# Patient Record
Sex: MALE | Race: BLACK OR AFRICAN AMERICAN | Employment: FULL TIME | URBAN - METROPOLITAN AREA
[De-identification: names, ages, dates, MRNs, and addresses within clinical notes are randomized per-mention and may not be internally consistent; named-entity substitution may affect disease eponyms.]

---

## 2017-09-29 ENCOUNTER — HOSPITAL ENCOUNTER (OUTPATIENT)
Dept: LAB | Age: 57
Discharge: HOME | End: 2017-09-29
Payer: OTHER MISCELLANEOUS

## 2017-09-29 PROCEDURE — 36415 COLL VENOUS BLD VENIPUNCTURE: CPT | Performed by: PHYSICIAN ASSISTANT

## 2017-09-29 PROCEDURE — 80307 DRUG TEST PRSMV CHEM ANLYZR: CPT

## 2017-10-09 LAB
Lab: NORMAL
REFERENCE LAB,REFLB: NORMAL
TEST DESCRIPTION:,ATST: NORMAL

## 2018-04-25 ENCOUNTER — HOSPITAL ENCOUNTER (EMERGENCY)
Age: 58
Discharge: HOME | End: 2018-04-25
Attending: EMERGENCY MEDICINE
Payer: COMMERCIAL

## 2018-04-25 VITALS
DIASTOLIC BLOOD PRESSURE: 89 MMHG | HEIGHT: 72 IN | HEART RATE: 90 BPM | TEMPERATURE: 98.1 F | OXYGEN SATURATION: 97 % | BODY MASS INDEX: 27.09 KG/M2 | WEIGHT: 200 LBS | RESPIRATION RATE: 16 BRPM | SYSTOLIC BLOOD PRESSURE: 135 MMHG

## 2018-04-25 DIAGNOSIS — S05.02XA ABRASION OF LEFT CORNEA, INITIAL ENCOUNTER: Primary | ICD-10-CM

## 2018-04-25 PROCEDURE — 99284 EMERGENCY DEPT VISIT MOD MDM: CPT

## 2018-04-25 PROCEDURE — 74011250636 HC RX REV CODE- 250/636: Performed by: PHYSICIAN ASSISTANT

## 2018-04-25 PROCEDURE — 74011000250 HC RX REV CODE- 250: Performed by: NURSE PRACTITIONER

## 2018-04-25 RX ORDER — PROPARACAINE HYDROCHLORIDE 5 MG/ML
1 SOLUTION/ DROPS OPHTHALMIC
Status: COMPLETED | OUTPATIENT
Start: 2018-04-25 | End: 2018-04-25

## 2018-04-25 RX ORDER — SODIUM CHLORIDE 9 MG/ML
1000 INJECTION, SOLUTION INTRAVENOUS ONCE
Status: COMPLETED | OUTPATIENT
Start: 2018-04-25 | End: 2018-04-25

## 2018-04-25 RX ORDER — SODIUM CHLORIDE 9 MG/ML
1000 INJECTION, SOLUTION INTRAVENOUS CONTINUOUS
Status: DISCONTINUED | OUTPATIENT
Start: 2018-04-25 | End: 2018-04-25 | Stop reason: ALTCHOICE

## 2018-04-25 RX ORDER — ERYTHROMYCIN 5 MG/G
OINTMENT OPHTHALMIC
Qty: 3.5 G | Refills: 0 | Status: SHIPPED | OUTPATIENT
Start: 2018-04-25 | End: 2019-11-15

## 2018-04-25 RX ADMIN — PROPARACAINE HYDROCHLORIDE 1 DROP: 5 SOLUTION/ DROPS OPHTHALMIC at 13:49

## 2018-04-25 RX ADMIN — FLUORESCEIN SODIUM 1 STRIP: 0.6 STRIP OPHTHALMIC at 15:07

## 2018-04-25 RX ADMIN — SODIUM CHLORIDE 1000 ML: 9 INJECTION, SOLUTION INTRAVENOUS at 13:49

## 2018-04-25 NOTE — DISCHARGE INSTRUCTIONS
Corneal Scratches: Care Instructions  Your Care Instructions    The cornea is the clear surface that covers the front of the eye. When a speck of dirt, a wood chip, an insect, or another object flies into your eye, it can cause a painful scratch on the cornea. Wearing contact lenses too long or rubbing your eyes can also scratch the cornea. Small scratches usually heal in a day or two. Deeper scratches may take longer. If you have had a foreign object removed from your eye or you have a corneal scratch, you will need to watch for infection and vision problems while your eye heals. Follow-up care is a key part of your treatment and safety. Be sure to make and go to all appointments, and call your doctor if you are having problems. It's also a good idea to know your test results and keep a list of the medicines you take. How can you care for yourself at home? · The doctor probably used a medicine during your exam to numb your eye. When it wears off in 30 to 60 minutes, your eye pain may come back. Take pain medicines exactly as directed. ¨ If the doctor gave you a prescription medicine for pain, take it as prescribed. ¨ If you are not taking a prescription pain medicine, ask your doctor if you can take an over-the-counter medicine. ¨ Do not take two or more pain medicines at the same time unless the doctor told you to. Many pain medicines have acetaminophen, which is Tylenol. Too much acetaminophen (Tylenol) can be harmful. · Do not rub your injured eye. Rubbing can make it worse. · Use the prescribed eyedrops or ointment as directed. Be sure the dropper or bottle tip is clean. To put in eyedrops or ointment:  ¨ Tilt your head back, and pull your lower eyelid down with one finger. ¨ Drop or squirt the medicine inside the lower lid. ¨ Close your eye for 30 to 60 seconds to let the drops or ointment move around. ¨ Do not touch the ointment or dropper tip to your eyelashes or any other surface.   · Do not use your contact lens in your hurt eye until your doctor says you can. Also, do not wear eye makeup until your eye has healed. · Do not drive if you have blurred vision. · Bright light may hurt. Sunglasses can help. · To prevent eye injuries in the future, wear safety glasses or goggles when you work with machines or tools, mow the lawn, or ride a bike or motorcycle. When should you call for help? Call your doctor now or seek immediate medical care if:  ? · You have signs of an eye infection, such as:  ¨ Pus or thick discharge coming from the eye. ¨ Redness or swelling around the eye. ¨ A fever. ? · You have new or worse eye pain. ? · You have vision changes. ? · It feels like there is something in your eye. ? · Light hurts your eye. ? Watch closely for changes in your health, and be sure to contact your doctor if:  ? · You do not get better as expected. Where can you learn more? Go to http://yuliana-claire.info/. Enter Y137 in the search box to learn more about \"Corneal Scratches: Care Instructions. \"  Current as of: March 3, 2017  Content Version: 11.4  © 6995-8595 [employer]. Care instructions adapted under license by [employer] (which disclaims liability or warranty for this information). If you have questions about a medical condition or this instruction, always ask your healthcare professional. [place] [address] any warranty or liability for your use of this information.

## 2018-04-25 NOTE — ED PROVIDER NOTES
EMERGENCY DEPARTMENT HISTORY AND PHYSICAL EXAM    Date: 4/25/2018  Patient Name: Kristina Smart    History of Presenting Illness     Chief Complaint   Patient presents with    Foreign Body in Eye         History Provided By: Patient    Chief Complaint: left eye foreign body and pain  Duration: just prior to arrival just prior to arrival  Timing:  Constant  Location: left eye  Quality: \"irritating'  Severity: Mild  Modifying Factors: no aggravating or alleviating factor  Associated Symptoms: denies any other associated signs or symptoms      Additional History (Context): Kristina Smart is a 56 y.o. male with No significant past medical history who presents with C/O plaster in his left eye. Pt was working with plaster above his head when he felt some get into his left eye. He reports foreign body sensation, difficult to open eye, and feels like there is still plaster in his eye. Patient denies wearing contacts. He does wear glasses. Pt denies headache, trauma or foreign body sensation in right eye or any other symptoms or complaints. PCP: None    Current Outpatient Prescriptions   Medication Sig Dispense Refill    erythromycin (ILOTYCIN) ophthalmic ointment Apply 0.5cm strip of ointment to left eye four times a day for 5 days 3.5 g 0    oxyCODONE-acetaminophen (PERCOCET) 5-325 mg per tablet Take 1 Tab by mouth every four (4) hours as needed for Pain. Past History     Past Medical History:  Past Medical History:   Diagnosis Date    Back pain     Cortisone shots    Hematuria     Prostate cancer screening        Past Surgical History:  History reviewed. No pertinent surgical history.     Family History:  Family History   Problem Relation Age of Onset    Hypertension Mother     Diabetes Mother        Social History:  Social History   Substance Use Topics    Smoking status: Current Every Day Smoker     Packs/day: 0.50     Types: Cigars    Smokeless tobacco: Never Used    Alcohol use Yes       Allergies:  No Known Allergies      Review of Systems   Review of Systems   Constitutional: Negative for chills and fever. HENT: Negative for ear pain, sinus pain and sore throat. Eyes: Positive for pain and redness. Negative for photophobia and visual disturbance. All other systems reviewed and are negative. All Other Systems Negative  Physical Exam     Vitals:    04/25/18 1332   BP: 135/89   Pulse: 90   Resp: 16   Temp: 98.1 °F (36.7 °C)   SpO2: 97%   Weight: 90.7 kg (200 lb)   Height: 6' (1.829 m)     Physical Exam   Constitutional: He is oriented to person, place, and time. He appears well-nourished. No distress. Eyes: Conjunctivae, EOM and lids are normal. Pupils are equal, round, and reactive to light. Lids are everted and swept, no foreign bodies found. Right eye exhibits no chemosis, no discharge, no exudate and no hordeolum. No foreign body present in the right eye. Left eye exhibits no chemosis, no exudate and no hordeolum. No foreign body present in the left eye. Left conjunctiva is not injected. Left conjunctiva has no hemorrhage. Left eye exhibits normal extraocular motion and no nystagmus. Left pupil is round and reactive. Neck: Normal range of motion. Neck supple. Cardiovascular: Normal rate, regular rhythm, normal heart sounds and intact distal pulses. Pulmonary/Chest: Effort normal and breath sounds normal. No respiratory distress. He has no wheezes. He has no rales. Lymphadenopathy:     He has no cervical adenopathy. Neurological: He is alert and oriented to person, place, and time. Skin: Skin is warm and dry. He is not diaphoretic. Nursing note and vitals reviewed. Diagnostic Study Results     Labs -   No results found for this or any previous visit (from the past 12 hour(s)).     Radiologic Studies -   No orders to display     CT Results  (Last 48 hours)    None        CXR Results  (Last 48 hours)    None            Medical Decision Making   I am the first provider for this patient. I reviewed the vital signs, available nursing notes, past medical history, past surgical history, family history and social history. Vital Signs-Reviewed the patient's vital signs. Pulse Oximetry Analysis - 97% on room air        Records Reviewed: Nursing Notes    Procedures:  Eye Stain    Date/Time: 4/25/2018 2:57 PM    Performed by: NP  Supervising provider: Dr. Ced Clark        Corneal abrasion was present on eyelid eversion. Left eye location: 6 and 8 (large corneal abrasion between 6 and 8 O'clock) o'clock    Cornea is clear. Anterior chamber is clear. Patient tolerance: Patient tolerated the procedure well with no immediate complications  My total time at bedside, performing this procedure was 1-15 minutes. Provider Notes (Medical Decision Making):     DDX: retained foreign body, corneal abrasion, corneal ulcer, or other process. 55 YO M presents to ED for evaluation after plaster got in his left eye at work. Patient denies wearing contacts. Will irritate eye with yanet's lens and saline. Will stain eye after irrigating with 1liter of fluid. 2:58 PM: Pt received 750ml of normal saline irrigation to left eye. Pt reports his eye feels better but reports a \"gritting\" sensation. Eye dyed with fluorescein die and large corneal abrasion seen between 6-8 O'Clock position. Pt's EOM intact, denies change in vision. Advised patient to return to emergency department immediately for change in vision, pain when moving eye, fever, swelling to eye, discharge, or any change or worsening condition or concern. MED RECONCILIATION:  No current facility-administered medications for this encounter. Current Outpatient Prescriptions   Medication Sig    erythromycin (ILOTYCIN) ophthalmic ointment Apply 0.5cm strip of ointment to left eye four times a day for 5 days    oxyCODONE-acetaminophen (PERCOCET) 5-325 mg per tablet Take 1 Tab by mouth every four (4) hours as needed for Pain. Disposition: d/c home    DISCHARGE NOTE:   Pt has been reexamined. Patient has no new complaints, changes, or physical findings. Care plan outlined and precautions discussed. All medications were reviewed with the patient; will d/c home with erythromycin. All of pt's questions and concerns were addressed. Patient was instructed and agrees to follow up with PCP and opthamology, as well as to return to the ED upon further deterioration. Patient is ready to go home. Follow-up Information     Follow up With Details Comments [address] [facility] Call today for follow up appointment. [address] [place] [address]  [phone]          Current Discharge Medication List      START taking these medications    Details   erythromycin (ILOTYCIN) ophthalmic ointment Apply 0.5cm strip of ointment to left eye four times a day for 5 days  Qty: 3.5 g, Refills: 0               Diagnosis     Clinical Impression:   1.  Abrasion of left cornea, initial encounter

## 2018-04-25 NOTE — Clinical Note
Use erythromycin ointment as prescribed. Follow up with opthalmologist provided for follow up of your corneal abrasion. Return to emergency department for change or worsening symptoms or concerns.

## 2019-11-15 ENCOUNTER — HOSPITAL ENCOUNTER (EMERGENCY)
Age: 59
Discharge: HOME | End: 2019-11-15
Attending: EMERGENCY MEDICINE
Payer: MEDICARE

## 2019-11-15 ENCOUNTER — APPOINTMENT (OUTPATIENT)
Dept: MRI IMAGING | Age: 59
End: 2019-11-15
Attending: EMERGENCY MEDICINE
Payer: MEDICARE

## 2019-11-15 ENCOUNTER — APPOINTMENT (OUTPATIENT)
Dept: GENERAL RADIOLOGY | Age: 59
End: 2019-11-15
Attending: EMERGENCY MEDICINE
Payer: MEDICARE

## 2019-11-15 ENCOUNTER — APPOINTMENT (OUTPATIENT)
Dept: CT IMAGING | Age: 59
End: 2019-11-15
Attending: EMERGENCY MEDICINE
Payer: MEDICARE

## 2019-11-15 VITALS
BODY MASS INDEX: 25.42 KG/M2 | SYSTOLIC BLOOD PRESSURE: 127 MMHG | RESPIRATION RATE: 14 BRPM | HEART RATE: 61 BPM | WEIGHT: 187.4 LBS | DIASTOLIC BLOOD PRESSURE: 60 MMHG | OXYGEN SATURATION: 98 % | TEMPERATURE: 97.5 F

## 2019-11-15 DIAGNOSIS — R51.9 NONINTRACTABLE EPISODIC HEADACHE, UNSPECIFIED HEADACHE TYPE: Primary | ICD-10-CM

## 2019-11-15 DIAGNOSIS — R20.0 NUMBNESS AND TINGLING: ICD-10-CM

## 2019-11-15 DIAGNOSIS — R20.2 NUMBNESS AND TINGLING: ICD-10-CM

## 2019-11-15 LAB
ALBUMIN SERPL-MCNC: 4.2 G/DL (ref 3.4–5)
ALBUMIN/GLOB SERPL: 1.1 {RATIO} (ref 0.8–1.7)
ALP SERPL-CCNC: 74 U/L (ref 45–117)
ALT SERPL-CCNC: 29 U/L (ref 16–61)
ANION GAP SERPL CALC-SCNC: 3 MMOL/L (ref 3–18)
AST SERPL-CCNC: 18 U/L (ref 10–38)
ATRIAL RATE: 52 BPM
BASOPHILS # BLD: 0 K/UL (ref 0–0.1)
BASOPHILS NFR BLD: 0 % (ref 0–2)
BILIRUB SERPL-MCNC: 0.4 MG/DL (ref 0.2–1)
BUN SERPL-MCNC: 14 MG/DL (ref 7–18)
BUN/CREAT SERPL: 14 (ref 12–20)
CALCIUM SERPL-MCNC: 8.8 MG/DL (ref 8.5–10.1)
CALCULATED P AXIS, ECG09: 62 DEGREES
CALCULATED R AXIS, ECG10: 53 DEGREES
CALCULATED T AXIS, ECG11: 43 DEGREES
CHLORIDE SERPL-SCNC: 106 MMOL/L (ref 100–111)
CO2 SERPL-SCNC: 30 MMOL/L (ref 21–32)
CREAT SERPL-MCNC: 1.01 MG/DL (ref 0.6–1.3)
DIAGNOSIS, 93000: NORMAL
DIFFERENTIAL METHOD BLD: NORMAL
EOSINOPHIL # BLD: 0.1 K/UL (ref 0–0.4)
EOSINOPHIL NFR BLD: 2 % (ref 0–5)
ERYTHROCYTE [DISTWIDTH] IN BLOOD BY AUTOMATED COUNT: 13.6 % (ref 11.6–14.5)
GLOBULIN SER CALC-MCNC: 3.7 G/DL (ref 2–4)
GLUCOSE BLD STRIP.AUTO-MCNC: 97 MG/DL (ref 70–110)
GLUCOSE SERPL-MCNC: 86 MG/DL (ref 74–99)
HCT VFR BLD AUTO: 45.2 % (ref 36–48)
HGB BLD-MCNC: 15.3 G/DL (ref 13–16)
INR PPP: 0.9 (ref 0.8–1.2)
LYMPHOCYTES # BLD: 2.5 K/UL (ref 0.9–3.6)
LYMPHOCYTES NFR BLD: 36 % (ref 21–52)
MAGNESIUM SERPL-MCNC: 2.4 MG/DL (ref 1.6–2.6)
MCH RBC QN AUTO: 32 PG (ref 24–34)
MCHC RBC AUTO-ENTMCNC: 33.8 G/DL (ref 31–37)
MCV RBC AUTO: 94.6 FL (ref 74–97)
MONOCYTES # BLD: 0.5 K/UL (ref 0.05–1.2)
MONOCYTES NFR BLD: 7 % (ref 3–10)
NEUTS SEG # BLD: 3.8 K/UL (ref 1.8–8)
NEUTS SEG NFR BLD: 55 % (ref 40–73)
P-R INTERVAL, ECG05: 178 MS
PLATELET # BLD AUTO: 232 K/UL (ref 135–420)
PMV BLD AUTO: 11.5 FL (ref 9.2–11.8)
POTASSIUM SERPL-SCNC: 3.7 MMOL/L (ref 3.5–5.5)
PROT SERPL-MCNC: 7.9 G/DL (ref 6.4–8.2)
PROTHROMBIN TIME: 12 SEC (ref 11.5–15.2)
Q-T INTERVAL, ECG07: 412 MS
QRS DURATION, ECG06: 82 MS
QTC CALCULATION (BEZET), ECG08: 383 MS
RBC # BLD AUTO: 4.78 M/UL (ref 4.7–5.5)
SODIUM SERPL-SCNC: 139 MMOL/L (ref 136–145)
VENTRICULAR RATE, ECG03: 52 BPM
WBC # BLD AUTO: 6.9 K/UL (ref 4.6–13.2)

## 2019-11-15 PROCEDURE — 93005 ELECTROCARDIOGRAM TRACING: CPT

## 2019-11-15 PROCEDURE — 82962 GLUCOSE BLOOD TEST: CPT

## 2019-11-15 PROCEDURE — 80053 COMPREHEN METABOLIC PANEL: CPT

## 2019-11-15 PROCEDURE — 70450 CT HEAD/BRAIN W/O DYE: CPT

## 2019-11-15 PROCEDURE — 99285 EMERGENCY DEPT VISIT HI MDM: CPT

## 2019-11-15 PROCEDURE — 71045 X-RAY EXAM CHEST 1 VIEW: CPT

## 2019-11-15 PROCEDURE — 85610 PROTHROMBIN TIME: CPT

## 2019-11-15 PROCEDURE — 83735 ASSAY OF MAGNESIUM: CPT

## 2019-11-15 PROCEDURE — 70544 MR ANGIOGRAPHY HEAD W/O DYE: CPT

## 2019-11-15 PROCEDURE — 70547 MR ANGIOGRAPHY NECK W/O DYE: CPT

## 2019-11-15 PROCEDURE — 70551 MRI BRAIN STEM W/O DYE: CPT

## 2019-11-15 PROCEDURE — 85025 COMPLETE CBC W/AUTO DIFF WBC: CPT

## 2019-11-15 NOTE — ED NOTES
Assumed care of patient. Received patient resting on stretcher, awake, alert, oriented x 3. Introduced myself as his primary nurse. Awaiting diagnostic results and disposition. Explanation of wait provided to patient. Patient verbalized understanding. Will continue to monitor.

## 2019-11-15 NOTE — ED PROVIDER NOTES
EMERGENCY DEPARTMENT HISTORY AND PHYSICAL EXAM  This was created with voice recognition software and transcription errors may be present. 8:26 AM  Date: 11/15/2019  Patient Name: Staci Soto    History of Presenting Illness     Chief Complaint:    History Provided By:     HPI: Staci Soto is a 64 y.o. male who is been having a greater generalized intermittent left-sided headache over the past month that initially started at night when he would lay down or watch TV and is become more constant is now occurring during the day. He was seen by [facility] earlier in the week and referred to a neurosurgeon. He then noticed last night around 4 AM he got up to go the bathroom he noticed a sharp tingling sensation over his entire left side of his body. No weakness. This lasted about 2 minutes and resolved without intervention    PCP: None      Past History     Past Medical History:  Past Medical History:   Diagnosis Date    Back pain     Cortisone shots    Hematuria     Prostate cancer screening        Past Surgical History:  History reviewed. No pertinent surgical history. Family History:  Family History   Problem Relation Age of Onset    Hypertension Mother     Diabetes Mother        Social History:  Social History     Tobacco Use    Smoking status: Current Every Day Smoker     Packs/day: 0.50     Types: Cigars    Smokeless tobacco: Never Used   Substance Use Topics    Alcohol use: Yes    Drug use: Not on file       Allergies:  No Known Allergies    Review of Systems     Review of Systems   All other systems reviewed and are negative. 10 point review of systems otherwise negative unless noted in HPI. Physical Exam       Physical Exam   Constitutional: He is oriented to person, place, and time. He appears well-developed. HENT:   Head: Normocephalic and atraumatic. Eyes: Pupils are equal, round, and reactive to light. EOM are normal.   Neck: Normal range of motion. Neck supple. Cardiovascular: Normal rate, regular rhythm and normal heart sounds. Exam reveals no friction rub. No murmur heard. Pulmonary/Chest: Effort normal and breath sounds normal. No respiratory distress. He has no wheezes. Abdominal: Soft. He exhibits no distension. There is no tenderness. There is no rebound and no guarding. Musculoskeletal: Normal range of motion. Neurological: He is alert and oriented to person, place, and time. Skin: Skin is warm and dry. Psychiatric: He has a normal mood and affect. His behavior is normal. Thought content normal.       Diagnostic Study Results     Vital Signs   Visit Vitals  /75 (BP 1 Location: Left arm)   Pulse 66   Temp 97.5 °F (36.4 °C)   Resp 17   Wt 85 kg (187 lb 6.4 oz)   SpO2 98%   BMI 25.42 kg/m²      EKG: EKG shows sinus at 52 with a normal axis and normal intervals there is no ST elevation or depression and no hypertrophy  Labs:   Imaging:     Medical Decision Making     ED Course: Progress Notes, Reevaluation, and Consults:      Provider Notes (Medical Decision Making): 61-year-old gentleman with 2 problems    1. Headache worse at night we will check CT may need outpatient MRI    2.  Left-sided tingling. More of a positive sensation to the negative sensation concerning for TIA but will be assessed by tele-neuro    MRI brain wnl  MRA brain reviewed. MRA neck reviewed. Dw/ teleneuro; ok to d/c for continued outpt follow up. Diagnosis     Clinical Impression: No diagnosis found. Disposition:    Patient's Medications   Start Taking    No medications on file   Continue Taking    No medications on file   These Medications have changed    No medications on file   Stop Taking    ERYTHROMYCIN (ILOTYCIN) OPHTHALMIC OINTMENT    Apply 0.5cm strip of ointment to left eye four times a day for 5 days    METHOCARBAMOL (ROBAXIN) 500 MG TABLET    Take 1 Tab by mouth four (4) times daily.     OXYCODONE-ACETAMINOPHEN (PERCOCET) 5-325 MG PER TABLET Take 1 Tab by mouth every four (4) hours as needed for Pain.

## 2019-11-15 NOTE — DISCHARGE INSTRUCTIONS
Patient Education        Numbness and Tingling: Care Instructions  Your Care Instructions    Many things can cause numbness or tingling. Swelling may put pressure on a nerve. This could cause you to lose feeling or have a pins-and-needles sensation on part of your body. Nerves may be damaged from trauma, toxins, or diseases, such as diabetes or multiple sclerosis (MS). Sometimes, though, the cause is not clear. If there is no clear reason for your symptoms, and you are not having any other symptoms, your doctor may suggest watching and waiting for a while to see if the numbness or tingling goes away on its own. Your doctor may want you to have blood or nerve tests to find the cause of your symptoms. Follow-up care is a key part of your treatment and safety. Be sure to make and go to all appointments, and call your doctor if you are having problems. It's also a good idea to know your test results and keep a list of the medicines you take. How can you care for yourself at home? · If your doctor prescribes medicine, take it exactly as directed. Call your doctor if you think you are having a problem with your medicine. · If you have any swelling, put ice or a cold pack on the area for 10 to 20 minutes at a time. Put a thin cloth between the ice and your skin. When should you call for help? Call 911 anytime you think you may need emergency care. For example, call if:    · You have weakness, numbness, or tingling in both legs.     · You lose bowel or bladder control.     · You have symptoms of a stroke. These may include:  ? Sudden numbness, tingling, weakness, or loss of movement in your face, arm, or leg, especially on only one side of your body. ? Sudden vision changes. ? Sudden trouble speaking. ? Sudden confusion or trouble understanding simple statements. ? Sudden problems with walking or balance.   ? A sudden, severe headache that is different from past headaches.    Watch closely for changes in your health, and be sure to contact your doctor if you have any problems, or if:    · You do not get better as expected. Where can you learn more? Go to http://yuliana-claire.info/. Enter D725 in the search box to learn more about \"Numbness and Tingling: Care Instructions. \"  Current as of: March 28, 2019  Content Version: 12.2  © 7219-7234 [employer]. Care instructions adapted under license by [employer] (which disclaims liability or warranty for this information). If you have questions about a medical condition or this instruction, always ask your healthcare professional. [address] any warranty or liability for your use of this information. Patient Education        Headache: Care Instructions  Your Care Instructions    Headaches have many possible causes. Most headaches aren't a sign of a more serious problem, and they will get better on their own. Home treatment may help you feel better faster. The doctor has checked you carefully, but problems can develop later. If you notice any problems or new symptoms, get medical treatment right away. Follow-up care is a key part of your treatment and safety. Be sure to make and go to all appointments, and call your doctor if you are having problems. It's also a good idea to know your test results and keep a list of the medicines you take. How can you care for yourself at home? · Do not drive if you have taken a prescription pain medicine. · Rest in a quiet, dark room until your headache is gone. Close your eyes and try to relax or go to sleep. Don't watch TV or read. · Put a cold, moist cloth or cold pack on the painful area for 10 to 20 minutes at a time. Put a thin cloth between the cold pack and your skin. · Use a warm, moist towel or a heating pad set on low to relax tight shoulder and neck muscles. · Have someone gently massage your neck and shoulders.   · Take pain medicines exactly as directed. ? If the doctor gave you a prescription medicine for pain, take it as prescribed. ? If you are not taking a prescription pain medicine, ask your doctor if you can take an over-the-counter medicine. · Be careful not to take pain medicine more often than the instructions allow, because you may get worse or more frequent headaches when the medicine wears off. · Do not ignore new symptoms that occur with a headache, such as a fever, weakness or numbness, vision changes, or confusion. These may be signs of a more serious problem. To prevent headaches  · Keep a headache diary so you can figure out what triggers your headaches. Avoiding triggers may help you prevent headaches. Record when each headache began, how long it lasted, and what the pain was like (throbbing, aching, stabbing, or dull). Write down any other symptoms you had with the headache, such as nausea, flashing lights or dark spots, or sensitivity to bright light or loud noise. Note if the headache occurred near your period. List anything that might have triggered the headache, such as certain foods (chocolate, cheese, wine) or odors, smoke, bright light, stress, or lack of sleep. · Find healthy ways to deal with stress. Headaches are most common during or right after stressful times. Take time to relax before and after you do something that has caused a headache in the past.  · Try to keep your muscles relaxed by keeping good posture. Check your jaw, face, neck, and shoulder muscles for tension, and try relaxing them. When sitting at a desk, change positions often, and stretch for 30 seconds each hour. · Get plenty of sleep and exercise. · Eat regularly and well. Long periods without food can trigger a headache. · Treat yourself to a massage. Some people find that regular massages are very helpful in relieving tension. · Limit caffeine by not drinking too much coffee, tea, or soda.  But don't quit caffeine suddenly, because that can also give you headaches. · Reduce eyestrain from computers by blinking frequently and looking away from the computer screen every so often. Make sure you have proper eyewear and that your monitor is set up properly, about an arm's length away. · Seek help if you have depression or anxiety. Your headaches may be linked to these conditions. Treatment can both prevent headaches and help with symptoms of anxiety or depression. When should you call for help? Call 911 anytime you think you may need emergency care. For example, call if:    · You have signs of a stroke. These may include:  ? Sudden numbness, paralysis, or weakness in your face, arm, or leg, especially on only one side of your body. ? Sudden vision changes. ? Sudden trouble speaking. ? Sudden confusion or trouble understanding simple statements. ? Sudden problems with walking or balance. ? A sudden, severe headache that is different from past headaches.    Call your doctor now or seek immediate medical care if:    · You have a new or worse headache.     · Your headache gets much worse. Where can you learn more? Go to http://yuliana-claire.info/. Enter M271 in the search box to learn more about \"Headache: Care Instructions. \"  Current as of: March 28, 2019  Content Version: 12.2  © 6464-4098 [employer]. Care instructions adapted under license by [employer] (which disclaims liability or warranty for this information). If you have questions about a medical condition or this instruction, always ask your healthcare professional. [place] [address] any warranty or liability for your use of this information.

## 2023-10-27 ENCOUNTER — APPOINTMENT (OUTPATIENT)
Facility: HOSPITAL | Age: 63
DRG: 321 | End: 2023-10-27
Payer: MEDICARE

## 2023-10-27 ENCOUNTER — HOSPITAL ENCOUNTER (INPATIENT)
Facility: HOSPITAL | Age: 63
LOS: 4 days | Discharge: HOME | DRG: 321 | End: 2023-10-31
Attending: STUDENT IN AN ORGANIZED HEALTH CARE EDUCATION/TRAINING PROGRAM | Admitting: INTERNAL MEDICINE
Payer: MEDICARE

## 2023-10-27 DIAGNOSIS — I21.3 STEMI (ST ELEVATION MYOCARDIAL INFARCTION) (HCC): Primary | ICD-10-CM

## 2023-10-27 DIAGNOSIS — R07.9 CHEST PAIN, UNSPECIFIED TYPE: ICD-10-CM

## 2023-10-27 LAB
ACT BLD: 185 SEC (ref 74–125)
ALBUMIN SERPL-MCNC: 4.1 G/DL (ref 3.5–5)
ALBUMIN/GLOB SERPL: 1.3 (ref 1.1–2.2)
ALP SERPL-CCNC: 65 U/L (ref 45–117)
ALT SERPL-CCNC: 25 U/L (ref 12–78)
ANION GAP SERPL CALC-SCNC: 9 MMOL/L (ref 5–15)
AST SERPL W P-5'-P-CCNC: 8 U/L (ref 15–37)
BASE DEFICIT BLD-SCNC: 6.9 MMOL/L
BASOPHILS # BLD: 0.1 K/UL (ref 0–0.1)
BASOPHILS NFR BLD: 1 % (ref 0–1)
BILIRUB SERPL-MCNC: 0.3 MG/DL (ref 0.2–1)
BUN SERPL-MCNC: 16 MG/DL (ref 6–20)
BUN/CREAT SERPL: 13 (ref 12–20)
CA-I BLD-MCNC: 1.15 MMOL/L (ref 1.12–1.32)
CA-I BLD-MCNC: 9.1 MG/DL (ref 8.5–10.1)
CHLORIDE BLD-SCNC: 110 MMOL/L (ref 98–107)
CHLORIDE SERPL-SCNC: 110 MMOL/L (ref 97–108)
CO2 BLD-SCNC: 17 MMOL/L
CO2 SERPL-SCNC: 24 MMOL/L (ref 21–32)
CREAT SERPL-MCNC: 1.27 MG/DL (ref 0.7–1.3)
CREAT UR-MCNC: 1.26 MG/DL (ref 0.6–1.3)
DIFFERENTIAL METHOD BLD: NORMAL
ECHO BSA: 2.04 M2
EOSINOPHIL # BLD: 0 K/UL (ref 0–0.4)
EOSINOPHIL NFR BLD: 1 % (ref 0–7)
ERYTHROCYTE [DISTWIDTH] IN BLOOD BY AUTOMATED COUNT: 13.8 % (ref 11.5–14.5)
GLOBULIN SER CALC-MCNC: 3.1 G/DL (ref 2–4)
GLUCOSE BLD STRIP.AUTO-MCNC: 318 MG/DL (ref 65–100)
GLUCOSE SERPL-MCNC: 152 MG/DL (ref 65–100)
HCO3 BLD-SCNC: 16.7 MMOL/L (ref 19–28)
HCT VFR BLD AUTO: 40.4 % (ref 36.6–50.3)
HGB BLD-MCNC: 13.7 G/DL (ref 12.1–17)
IMM GRANULOCYTES # BLD AUTO: 0 K/UL (ref 0–0.04)
IMM GRANULOCYTES NFR BLD AUTO: 0 % (ref 0–0.5)
INR PPP: 0.9 (ref 0.9–1.1)
LACTATE BLD-SCNC: 4.85 MMOL/L (ref 0.4–2)
LYMPHOCYTES # BLD: 2.2 K/UL (ref 0.8–3.5)
LYMPHOCYTES NFR BLD: 27 % (ref 12–49)
MCH RBC QN AUTO: 31.9 PG (ref 26–34)
MCHC RBC AUTO-ENTMCNC: 33.9 G/DL (ref 30–36.5)
MCV RBC AUTO: 94 FL (ref 80–99)
MONOCYTES # BLD: 0.5 K/UL (ref 0–1)
MONOCYTES NFR BLD: 7 % (ref 5–13)
NEUTS SEG # BLD: 5.1 K/UL (ref 1.8–8)
NEUTS SEG NFR BLD: 64 % (ref 32–75)
NRBC # BLD: 0 K/UL (ref 0–0.01)
NRBC BLD-RTO: 0 PER 100 WBC
PCO2 BLD: 28.1 MMHG (ref 35–45)
PERFORMED BY:: ABNORMAL
PERFORMED BY:: ABNORMAL
PH BLD: 7.38 (ref 7.35–7.45)
PLATELET # BLD AUTO: 250 K/UL (ref 150–400)
PMV BLD AUTO: 10.9 FL (ref 8.9–12.9)
PO2 BLD: 349 MMHG (ref 75–100)
POTASSIUM BLD-SCNC: 3.2 MMOL/L (ref 3.5–5.5)
POTASSIUM SERPL-SCNC: 3.5 MMOL/L (ref 3.5–5.1)
PROT SERPL-MCNC: 7.2 G/DL (ref 6.4–8.2)
PROTHROMBIN TIME: 12.5 SEC (ref 11.9–14.6)
RBC # BLD AUTO: 4.3 M/UL (ref 4.1–5.7)
SAO2 % BLD: 100 %
SERVICE CMNT-IMP: ABNORMAL
SODIUM BLD-SCNC: 140 MMOL/L (ref 136–145)
SODIUM SERPL-SCNC: 143 MMOL/L (ref 136–145)
SPECIMEN SITE: ABNORMAL
TROPONIN I SERPL HS-MCNC: 165 NG/L (ref 0–76)
UFH PPP CHRO-ACNC: <0.1 IU/ML
WBC # BLD AUTO: 7.9 K/UL (ref 4.1–11.1)

## 2023-10-27 PROCEDURE — 96365 THER/PROPH/DIAG IV INF INIT: CPT

## 2023-10-27 PROCEDURE — 94761 N-INVAS EAR/PLS OXIMETRY MLT: CPT

## 2023-10-27 PROCEDURE — 6360000002 HC RX W HCPCS: Performed by: STUDENT IN AN ORGANIZED HEALTH CARE EDUCATION/TRAINING PROGRAM

## 2023-10-27 PROCEDURE — 96375 TX/PRO/DX INJ NEW DRUG ADDON: CPT

## 2023-10-27 PROCEDURE — 84295 ASSAY OF SERUM SODIUM: CPT

## 2023-10-27 PROCEDURE — C1760 CLOSURE DEV, VASC: HCPCS | Performed by: INTERNAL MEDICINE

## 2023-10-27 PROCEDURE — 2500000003 HC RX 250 WO HCPCS: Performed by: EMERGENCY MEDICINE

## 2023-10-27 PROCEDURE — 99291 CRITICAL CARE FIRST HOUR: CPT

## 2023-10-27 PROCEDURE — C1887 CATHETER, GUIDING: HCPCS | Performed by: INTERNAL MEDICINE

## 2023-10-27 PROCEDURE — 82947 ASSAY GLUCOSE BLOOD QUANT: CPT

## 2023-10-27 PROCEDURE — 84132 ASSAY OF SERUM POTASSIUM: CPT

## 2023-10-27 PROCEDURE — 2580000003 HC RX 258: Performed by: STUDENT IN AN ORGANIZED HEALTH CARE EDUCATION/TRAINING PROGRAM

## 2023-10-27 PROCEDURE — 82330 ASSAY OF CALCIUM: CPT

## 2023-10-27 PROCEDURE — 99152 MOD SED SAME PHYS/QHP 5/>YRS: CPT | Performed by: INTERNAL MEDICINE

## 2023-10-27 PROCEDURE — 5A12012 PERFORMANCE OF CARDIAC OUTPUT, SINGLE, MANUAL: ICD-10-PCS | Performed by: STUDENT IN AN ORGANIZED HEALTH CARE EDUCATION/TRAINING PROGRAM

## 2023-10-27 PROCEDURE — 6360000002 HC RX W HCPCS: Performed by: INTERNAL MEDICINE

## 2023-10-27 PROCEDURE — B2111ZZ FLUOROSCOPY OF MULTIPLE CORONARY ARTERIES USING LOW OSMOLAR CONTRAST: ICD-10-PCS | Performed by: INTERNAL MEDICINE

## 2023-10-27 PROCEDURE — 93458 L HRT ARTERY/VENTRICLE ANGIO: CPT | Performed by: INTERNAL MEDICINE

## 2023-10-27 PROCEDURE — C1769 GUIDE WIRE: HCPCS | Performed by: INTERNAL MEDICINE

## 2023-10-27 PROCEDURE — 6360000002 HC RX W HCPCS: Performed by: EMERGENCY MEDICINE

## 2023-10-27 PROCEDURE — 92950 HEART/LUNG RESUSCITATION CPR: CPT

## 2023-10-27 PROCEDURE — 96374 THER/PROPH/DIAG INJ IV PUSH: CPT

## 2023-10-27 PROCEDURE — 2500000003 HC RX 250 WO HCPCS: Performed by: STUDENT IN AN ORGANIZED HEALTH CARE EDUCATION/TRAINING PROGRAM

## 2023-10-27 PROCEDURE — 85610 PROTHROMBIN TIME: CPT

## 2023-10-27 PROCEDURE — 6360000002 HC RX W HCPCS: Performed by: ANESTHESIOLOGY

## 2023-10-27 PROCEDURE — 6360000004 HC RX CONTRAST MEDICATION: Performed by: INTERNAL MEDICINE

## 2023-10-27 PROCEDURE — 2500000003 HC RX 250 WO HCPCS: Performed by: INTERNAL MEDICINE

## 2023-10-27 PROCEDURE — C1894 INTRO/SHEATH, NON-LASER: HCPCS | Performed by: INTERNAL MEDICINE

## 2023-10-27 PROCEDURE — 93005 ELECTROCARDIOGRAM TRACING: CPT | Performed by: STUDENT IN AN ORGANIZED HEALTH CARE EDUCATION/TRAINING PROGRAM

## 2023-10-27 PROCEDURE — 2500000003 HC RX 250 WO HCPCS: Performed by: ANESTHESIOLOGY

## 2023-10-27 PROCEDURE — C1874 STENT, COATED/COV W/DEL SYS: HCPCS | Performed by: INTERNAL MEDICINE

## 2023-10-27 PROCEDURE — 85025 COMPLETE CBC W/AUTO DIFF WBC: CPT

## 2023-10-27 PROCEDURE — 2100000000 HC CCU R&B

## 2023-10-27 PROCEDURE — 85347 COAGULATION TIME ACTIVATED: CPT

## 2023-10-27 PROCEDURE — 4A023N7 MEASUREMENT OF CARDIAC SAMPLING AND PRESSURE, LEFT HEART, PERCUTANEOUS APPROACH: ICD-10-PCS | Performed by: INTERNAL MEDICINE

## 2023-10-27 PROCEDURE — 82803 BLOOD GASES ANY COMBINATION: CPT

## 2023-10-27 PROCEDURE — 85520 HEPARIN ASSAY: CPT

## 2023-10-27 PROCEDURE — 6370000000 HC RX 637 (ALT 250 FOR IP)

## 2023-10-27 PROCEDURE — 027034Z DILATION OF CORONARY ARTERY, ONE ARTERY WITH DRUG-ELUTING INTRALUMINAL DEVICE, PERCUTANEOUS APPROACH: ICD-10-PCS | Performed by: INTERNAL MEDICINE

## 2023-10-27 PROCEDURE — 99153 MOD SED SAME PHYS/QHP EA: CPT | Performed by: INTERNAL MEDICINE

## 2023-10-27 PROCEDURE — 80053 COMPREHEN METABOLIC PANEL: CPT

## 2023-10-27 PROCEDURE — 36415 COLL VENOUS BLD VENIPUNCTURE: CPT

## 2023-10-27 PROCEDURE — 83605 ASSAY OF LACTIC ACID: CPT

## 2023-10-27 PROCEDURE — 71045 X-RAY EXAM CHEST 1 VIEW: CPT

## 2023-10-27 PROCEDURE — 93005 ELECTROCARDIOGRAM TRACING: CPT | Performed by: INTERNAL MEDICINE

## 2023-10-27 PROCEDURE — C9606 PERC D-E COR REVASC W AMI S: HCPCS | Performed by: INTERNAL MEDICINE

## 2023-10-27 PROCEDURE — 2709999900 HC NON-CHARGEABLE SUPPLY: Performed by: INTERNAL MEDICINE

## 2023-10-27 PROCEDURE — 84484 ASSAY OF TROPONIN QUANT: CPT

## 2023-10-27 PROCEDURE — 6370000000 HC RX 637 (ALT 250 FOR IP): Performed by: EMERGENCY MEDICINE

## 2023-10-27 PROCEDURE — 2580000003 HC RX 258: Performed by: INTERNAL MEDICINE

## 2023-10-27 PROCEDURE — 2500000003 HC RX 250 WO HCPCS

## 2023-10-27 DEVICE — STENT ONYXNG45015UX ONYX 4.50X15RX
Type: IMPLANTABLE DEVICE | Status: FUNCTIONAL
Brand: ONYX FRONTIER™

## 2023-10-27 RX ORDER — METOPROLOL SUCCINATE 50 MG/1
50 TABLET, EXTENDED RELEASE ORAL DAILY
Status: DISCONTINUED | OUTPATIENT
Start: 2023-10-28 | End: 2023-10-28

## 2023-10-27 RX ORDER — NOREPINEPHRINE BITARTRATE 0.06 MG/ML
INJECTION, SOLUTION INTRAVENOUS
Status: DISPENSED
Start: 2023-10-27 | End: 2023-10-28

## 2023-10-27 RX ORDER — ATROPINE SULFATE 0.1 MG/ML
INJECTION INTRAVENOUS DAILY PRN
Status: COMPLETED | OUTPATIENT
Start: 2023-10-27 | End: 2023-10-27

## 2023-10-27 RX ORDER — HEPARIN SODIUM 1000 [USP'U]/ML
2000 INJECTION, SOLUTION INTRAVENOUS; SUBCUTANEOUS PRN
Status: DISCONTINUED | OUTPATIENT
Start: 2023-10-27 | End: 2023-10-31 | Stop reason: HOSPADM

## 2023-10-27 RX ORDER — ASPIRIN 81 MG/1
324 TABLET, CHEWABLE ORAL DAILY
Status: DISCONTINUED | OUTPATIENT
Start: 2023-10-28 | End: 2023-10-28

## 2023-10-27 RX ORDER — AMIODARONE HYDROCHLORIDE 50 MG/ML
INJECTION, SOLUTION INTRAVENOUS DAILY PRN
Status: COMPLETED | OUTPATIENT
Start: 2023-10-27 | End: 2023-10-27

## 2023-10-27 RX ORDER — NOREPINEPHRINE BITARTRATE 0.06 MG/ML
INJECTION, SOLUTION INTRAVENOUS CONTINUOUS PRN
Status: COMPLETED | OUTPATIENT
Start: 2023-10-27 | End: 2023-10-27

## 2023-10-27 RX ORDER — ASPIRIN 81 MG/1
81 TABLET, CHEWABLE ORAL DAILY
Status: DISCONTINUED | OUTPATIENT
Start: 2023-10-28 | End: 2023-10-31 | Stop reason: HOSPADM

## 2023-10-27 RX ORDER — MAGNESIUM SULFATE IN WATER 40 MG/ML
2000 INJECTION, SOLUTION INTRAVENOUS ONCE
Status: COMPLETED | OUTPATIENT
Start: 2023-10-27 | End: 2023-10-27

## 2023-10-27 RX ORDER — CALCIUM CHLORIDE 100 MG/ML
INJECTION INTRAVENOUS; INTRAVENTRICULAR
Status: DISPENSED
Start: 2023-10-27 | End: 2023-10-28

## 2023-10-27 RX ORDER — DIPHENHYDRAMINE HYDROCHLORIDE 50 MG/ML
INJECTION INTRAMUSCULAR; INTRAVENOUS PRN
Status: DISCONTINUED | OUTPATIENT
Start: 2023-10-27 | End: 2023-10-27 | Stop reason: HOSPADM

## 2023-10-27 RX ORDER — EPTIFIBATIDE 0.75 MG/ML
INJECTION, SOLUTION INTRAVENOUS CONTINUOUS PRN
Status: DISCONTINUED | OUTPATIENT
Start: 2023-10-27 | End: 2023-10-27 | Stop reason: HOSPADM

## 2023-10-27 RX ORDER — MIDAZOLAM HYDROCHLORIDE 1 MG/ML
INJECTION INTRAMUSCULAR; INTRAVENOUS PRN
Status: DISCONTINUED | OUTPATIENT
Start: 2023-10-27 | End: 2023-10-27 | Stop reason: HOSPADM

## 2023-10-27 RX ORDER — LIDOCAINE HYDROCHLORIDE 10 MG/ML
INJECTION, SOLUTION INFILTRATION; PERINEURAL PRN
Status: DISCONTINUED | OUTPATIENT
Start: 2023-10-27 | End: 2023-10-27 | Stop reason: HOSPADM

## 2023-10-27 RX ORDER — HEPARIN SODIUM 1000 [USP'U]/ML
INJECTION, SOLUTION INTRAVENOUS; SUBCUTANEOUS PRN
Status: DISCONTINUED | OUTPATIENT
Start: 2023-10-27 | End: 2023-10-27 | Stop reason: HOSPADM

## 2023-10-27 RX ORDER — HEPARIN SODIUM 1000 [USP'U]/ML
4000 INJECTION, SOLUTION INTRAVENOUS; SUBCUTANEOUS ONCE
Status: COMPLETED | OUTPATIENT
Start: 2023-10-27 | End: 2023-10-27

## 2023-10-27 RX ORDER — MAGNESIUM SULFATE HEPTAHYDRATE 500 MG/ML
INJECTION, SOLUTION INTRAMUSCULAR; INTRAVENOUS DAILY PRN
Status: COMPLETED | OUTPATIENT
Start: 2023-10-27 | End: 2023-10-27

## 2023-10-27 RX ORDER — SODIUM CHLORIDE 9 MG/ML
INJECTION, SOLUTION INTRAVENOUS CONTINUOUS PRN
Status: COMPLETED | OUTPATIENT
Start: 2023-10-27 | End: 2023-10-27

## 2023-10-27 RX ORDER — CALCIUM CHLORIDE 100 MG/ML
INJECTION INTRAVENOUS; INTRAVENTRICULAR DAILY PRN
Status: COMPLETED | OUTPATIENT
Start: 2023-10-27 | End: 2023-10-27

## 2023-10-27 RX ORDER — FENTANYL CITRATE 50 UG/ML
INJECTION, SOLUTION INTRAMUSCULAR; INTRAVENOUS PRN
Status: DISCONTINUED | OUTPATIENT
Start: 2023-10-27 | End: 2023-10-27 | Stop reason: HOSPADM

## 2023-10-27 RX ORDER — ASPIRIN 81 MG/1
TABLET, CHEWABLE ORAL
Status: COMPLETED
Start: 2023-10-27 | End: 2023-10-27

## 2023-10-27 RX ORDER — HEPARIN SODIUM 1000 [USP'U]/ML
2000 INJECTION, SOLUTION INTRAVENOUS; SUBCUTANEOUS
Status: COMPLETED | OUTPATIENT
Start: 2023-10-27 | End: 2023-10-27

## 2023-10-27 RX ORDER — EPTIFIBATIDE 2 MG/ML
INJECTION, SOLUTION INTRAVENOUS PRN
Status: DISCONTINUED | OUTPATIENT
Start: 2023-10-27 | End: 2023-10-27 | Stop reason: HOSPADM

## 2023-10-27 RX ORDER — EPINEPHRINE IN SOD CHLOR,ISO 1 MG/10 ML
SYRINGE (ML) INTRAVENOUS DAILY PRN
Status: COMPLETED | OUTPATIENT
Start: 2023-10-27 | End: 2023-10-27

## 2023-10-27 RX ORDER — AMIODARONE HYDROCHLORIDE 50 MG/ML
INJECTION, SOLUTION INTRAVENOUS
Status: DISPENSED
Start: 2023-10-27 | End: 2023-10-28

## 2023-10-27 RX ORDER — HEPARIN SODIUM 10000 [USP'U]/100ML
5-30 INJECTION, SOLUTION INTRAVENOUS CONTINUOUS
Status: DISCONTINUED | OUTPATIENT
Start: 2023-10-27 | End: 2023-10-31 | Stop reason: HOSPADM

## 2023-10-27 RX ORDER — ATORVASTATIN CALCIUM 40 MG/1
80 TABLET, FILM COATED ORAL NIGHTLY
Status: CANCELLED | OUTPATIENT
Start: 2023-10-27

## 2023-10-27 RX ORDER — HEPARIN SODIUM 1000 [USP'U]/ML
4000 INJECTION, SOLUTION INTRAVENOUS; SUBCUTANEOUS PRN
Status: DISCONTINUED | OUTPATIENT
Start: 2023-10-27 | End: 2023-10-31 | Stop reason: HOSPADM

## 2023-10-27 RX ADMIN — TICAGRELOR 180 MG: 90 TABLET ORAL at 22:24

## 2023-10-27 RX ADMIN — HEPARIN SODIUM AND DEXTROSE 12 UNITS/KG/HR: 10000; 5 INJECTION INTRAVENOUS at 21:27

## 2023-10-27 RX ADMIN — AMIODARONE HYDROCHLORIDE 0.5 MG/MIN: 1.8 INJECTION, SOLUTION INTRAVENOUS at 22:26

## 2023-10-27 RX ADMIN — SODIUM BICARBONATE: 84 INJECTION, SOLUTION INTRAVENOUS at 22:09

## 2023-10-27 RX ADMIN — HEPARIN SODIUM 2000 UNITS: 1000 INJECTION INTRAVENOUS; SUBCUTANEOUS at 22:27

## 2023-10-27 RX ADMIN — AMIODARONE HYDROCHLORIDE 150 MG: 50 INJECTION, SOLUTION INTRAVENOUS at 21:38

## 2023-10-27 RX ADMIN — MAGNESIUM SULFATE HEPTAHYDRATE 2000 MG: 500 INJECTION, SOLUTION INTRAMUSCULAR; INTRAVENOUS at 21:37

## 2023-10-27 RX ADMIN — ASPIRIN 81 MG: 81 TABLET ORAL at 21:09

## 2023-10-27 RX ADMIN — ATROPINE SULFATE 0.5 MG: 0.1 INJECTION PARENTERAL at 22:06

## 2023-10-27 RX ADMIN — MAGNESIUM SULFATE HEPTAHYDRATE 2000 MG: 40 INJECTION, SOLUTION INTRAVENOUS at 21:25

## 2023-10-27 RX ADMIN — HEPARIN SODIUM 4000 UNITS: 1000 INJECTION INTRAVENOUS; SUBCUTANEOUS at 21:24

## 2023-10-27 RX ADMIN — SODIUM CHLORIDE 999 ML/HR: 9 INJECTION, SOLUTION INTRAVENOUS at 21:44

## 2023-10-27 RX ADMIN — SODIUM BICARBONATE 50 MEQ: 84 INJECTION, SOLUTION INTRAVENOUS at 22:08

## 2023-10-27 RX ADMIN — Medication 10 MCG/MIN: at 21:48

## 2023-10-27 RX ADMIN — EPINEPHRINE 1 MG: 0.1 INJECTION INTRACARDIAC; INTRAVENOUS at 21:35

## 2023-10-27 RX ADMIN — CALCIUM CHLORIDE 1000 MG: 100 INJECTION, SOLUTION INTRAVENOUS at 22:10

## 2023-10-27 RX ADMIN — EPINEPHRINE 10 MCG/MIN: 1 INJECTION INTRAMUSCULAR; INTRAVENOUS; SUBCUTANEOUS at 22:02

## 2023-10-27 ASSESSMENT — PAIN SCALES - GENERAL
PAINLEVEL_OUTOF10: 8
PAINLEVEL_OUTOF10: 6
PAINLEVEL_OUTOF10: 8

## 2023-10-27 ASSESSMENT — PAIN - FUNCTIONAL ASSESSMENT: PAIN_FUNCTIONAL_ASSESSMENT: 0-10

## 2023-10-27 ASSESSMENT — PAIN DESCRIPTION - LOCATION: LOCATION: CHEST

## 2023-10-28 ENCOUNTER — APPOINTMENT (OUTPATIENT)
Facility: HOSPITAL | Age: 63
DRG: 321 | End: 2023-10-28
Attending: INTERNAL MEDICINE
Payer: MEDICARE

## 2023-10-28 PROBLEM — I47.20 VENTRICULAR TACHYCARDIA (HCC): Status: ACTIVE | Noted: 2023-10-28

## 2023-10-28 PROBLEM — E11.65 UNCONTROLLED TYPE 2 DIABETES MELLITUS WITH HYPERGLYCEMIA (HCC): Status: ACTIVE | Noted: 2023-10-28

## 2023-10-28 PROBLEM — R57.0 CARDIOGENIC SHOCK (HCC): Status: ACTIVE | Noted: 2023-10-28

## 2023-10-28 LAB
ALBUMIN SERPL-MCNC: 3.7 G/DL (ref 3.5–5)
ALBUMIN/GLOB SERPL: 1.1 (ref 1.1–2.2)
ALP SERPL-CCNC: 66 U/L (ref 45–117)
ALT SERPL-CCNC: 96 U/L (ref 12–78)
ANION GAP SERPL CALC-SCNC: 14 MMOL/L (ref 5–15)
AST SERPL W P-5'-P-CCNC: 96 U/L (ref 15–37)
BILIRUB SERPL-MCNC: 0.5 MG/DL (ref 0.2–1)
BNP SERPL-MCNC: 220 PG/ML
BUN SERPL-MCNC: 15 MG/DL (ref 6–20)
BUN/CREAT SERPL: 10 (ref 12–20)
CA-I BLD-MCNC: 8.9 MG/DL (ref 8.5–10.1)
CHLORIDE SERPL-SCNC: 111 MMOL/L (ref 97–108)
CO2 SERPL-SCNC: 18 MMOL/L (ref 21–32)
CREAT SERPL-MCNC: 1.46 MG/DL (ref 0.7–1.3)
EKG ATRIAL RATE: 104 BPM
EKG ATRIAL RATE: 141 BPM
EKG ATRIAL RATE: 250 BPM
EKG ATRIAL RATE: 63 BPM
EKG ATRIAL RATE: 72 BPM
EKG ATRIAL RATE: 77 BPM
EKG ATRIAL RATE: 82 BPM
EKG ATRIAL RATE: 82 BPM
EKG DIAGNOSIS: NORMAL
EKG P AXIS: 143 DEGREES
EKG P AXIS: 64 DEGREES
EKG P AXIS: 67 DEGREES
EKG P AXIS: 75 DEGREES
EKG P AXIS: 77 DEGREES
EKG P AXIS: 79 DEGREES
EKG P AXIS: 81 DEGREES
EKG P-R INTERVAL: 174 MS
EKG P-R INTERVAL: 178 MS
EKG P-R INTERVAL: 184 MS
EKG P-R INTERVAL: 188 MS
EKG P-R INTERVAL: 194 MS
EKG P-R INTERVAL: 194 MS
EKG Q-T INTERVAL: 310 MS
EKG Q-T INTERVAL: 356 MS
EKG Q-T INTERVAL: 364 MS
EKG Q-T INTERVAL: 378 MS
EKG Q-T INTERVAL: 384 MS
EKG Q-T INTERVAL: 386 MS
EKG Q-T INTERVAL: 390 MS
EKG Q-T INTERVAL: 390 MS
EKG QRS DURATION: 100 MS
EKG QRS DURATION: 120 MS
EKG QRS DURATION: 124 MS
EKG QRS DURATION: 124 MS
EKG QRS DURATION: 162 MS
EKG QRS DURATION: 82 MS
EKG QRS DURATION: 84 MS
EKG QRS DURATION: 92 MS
EKG QTC CALCULATION (BAZETT): 399 MS
EKG QTC CALCULATION (BAZETT): 411 MS
EKG QTC CALCULATION (BAZETT): 422 MS
EKG QTC CALCULATION (BAZETT): 448 MS
EKG QTC CALCULATION (BAZETT): 455 MS
EKG QTC CALCULATION (BAZETT): 486 MS
EKG QTC CALCULATION (BAZETT): 497 MS
EKG QTC CALCULATION (BAZETT): 547 MS
EKG R AXIS: -57 DEGREES
EKG R AXIS: -60 DEGREES
EKG R AXIS: 19 DEGREES
EKG R AXIS: 40 DEGREES
EKG R AXIS: 47 DEGREES
EKG R AXIS: 57 DEGREES
EKG R AXIS: 58 DEGREES
EKG R AXIS: 97 DEGREES
EKG T AXIS: -37 DEGREES
EKG T AXIS: 31 DEGREES
EKG T AXIS: 55 DEGREES
EKG T AXIS: 61 DEGREES
EKG T AXIS: 69 DEGREES
EKG T AXIS: 87 DEGREES
EKG T AXIS: 91 DEGREES
EKG T AXIS: 97 DEGREES
EKG VENTRICULAR RATE: 104 BPM
EKG VENTRICULAR RATE: 142 BPM
EKG VENTRICULAR RATE: 148 BPM
EKG VENTRICULAR RATE: 63 BPM
EKG VENTRICULAR RATE: 72 BPM
EKG VENTRICULAR RATE: 77 BPM
EKG VENTRICULAR RATE: 82 BPM
EKG VENTRICULAR RATE: 82 BPM
EST. AVERAGE GLUCOSE BLD GHB EST-MCNC: 114 MG/DL
GLOBULIN SER CALC-MCNC: 3.5 G/DL (ref 2–4)
GLUCOSE BLD STRIP.AUTO-MCNC: 102 MG/DL (ref 65–100)
GLUCOSE BLD STRIP.AUTO-MCNC: 89 MG/DL (ref 65–100)
GLUCOSE BLD STRIP.AUTO-MCNC: 93 MG/DL (ref 65–100)
GLUCOSE SERPL-MCNC: 235 MG/DL (ref 65–100)
HBA1C MFR BLD: 5.6 % (ref 4–5.6)
LACTATE SERPL-SCNC: 3 MMOL/L (ref 0.4–2)
MAGNESIUM SERPL-MCNC: 2.5 MG/DL (ref 1.6–2.4)
MRSA DNA SPEC QL NAA+PROBE: NOT DETECTED
PERFORMED BY:: ABNORMAL
PERFORMED BY:: NORMAL
PERFORMED BY:: NORMAL
POTASSIUM SERPL-SCNC: 3.5 MMOL/L (ref 3.5–5.1)
PROT SERPL-MCNC: 7.2 G/DL (ref 6.4–8.2)
SODIUM SERPL-SCNC: 143 MMOL/L (ref 136–145)
TROPONIN I SERPL HS-MCNC: 5868 NG/L (ref 0–76)
TROPONIN I SERPL HS-MCNC: ABNORMAL NG/L (ref 0–76)
TSH SERPL DL<=0.05 MIU/L-ACNC: 1.17 UIU/ML (ref 0.36–3.74)
URATE SERPL-MCNC: 6.6 MG/DL (ref 3.5–7.2)

## 2023-10-28 PROCEDURE — 84484 ASSAY OF TROPONIN QUANT: CPT

## 2023-10-28 PROCEDURE — 87641 MR-STAPH DNA AMP PROBE: CPT

## 2023-10-28 PROCEDURE — 80307 DRUG TEST PRSMV CHEM ANLYZR: CPT

## 2023-10-28 PROCEDURE — 6360000002 HC RX W HCPCS: Performed by: INTERNAL MEDICINE

## 2023-10-28 PROCEDURE — 2580000003 HC RX 258: Performed by: INTERNAL MEDICINE

## 2023-10-28 PROCEDURE — 94761 N-INVAS EAR/PLS OXIMETRY MLT: CPT

## 2023-10-28 PROCEDURE — 83880 ASSAY OF NATRIURETIC PEPTIDE: CPT

## 2023-10-28 PROCEDURE — 80053 COMPREHEN METABOLIC PANEL: CPT

## 2023-10-28 PROCEDURE — 83605 ASSAY OF LACTIC ACID: CPT

## 2023-10-28 PROCEDURE — 6370000000 HC RX 637 (ALT 250 FOR IP): Performed by: HOSPITALIST

## 2023-10-28 PROCEDURE — 6370000000 HC RX 637 (ALT 250 FOR IP): Performed by: INTERNAL MEDICINE

## 2023-10-28 PROCEDURE — 2580000003 HC RX 258: Performed by: HOSPITALIST

## 2023-10-28 PROCEDURE — 36415 COLL VENOUS BLD VENIPUNCTURE: CPT

## 2023-10-28 PROCEDURE — 83036 HEMOGLOBIN GLYCOSYLATED A1C: CPT

## 2023-10-28 PROCEDURE — 83735 ASSAY OF MAGNESIUM: CPT

## 2023-10-28 PROCEDURE — 2000000000 HC ICU R&B

## 2023-10-28 PROCEDURE — 84443 ASSAY THYROID STIM HORMONE: CPT

## 2023-10-28 PROCEDURE — 93306 TTE W/DOPPLER COMPLETE: CPT

## 2023-10-28 PROCEDURE — 82962 GLUCOSE BLOOD TEST: CPT

## 2023-10-28 PROCEDURE — 84550 ASSAY OF BLOOD/URIC ACID: CPT

## 2023-10-28 PROCEDURE — 93005 ELECTROCARDIOGRAM TRACING: CPT | Performed by: INTERNAL MEDICINE

## 2023-10-28 PROCEDURE — 2700000000 HC OXYGEN THERAPY PER DAY

## 2023-10-28 RX ORDER — SODIUM CHLORIDE 9 MG/ML
INJECTION, SOLUTION INTRAVENOUS PRN
Status: DISCONTINUED | OUTPATIENT
Start: 2023-10-28 | End: 2023-10-31 | Stop reason: HOSPADM

## 2023-10-28 RX ORDER — SODIUM CHLORIDE 9 MG/ML
INJECTION, SOLUTION INTRAVENOUS CONTINUOUS
Status: DISCONTINUED | OUTPATIENT
Start: 2023-10-28 | End: 2023-10-31 | Stop reason: HOSPADM

## 2023-10-28 RX ORDER — SODIUM CHLORIDE 0.9 % (FLUSH) 0.9 %
5-40 SYRINGE (ML) INJECTION EVERY 12 HOURS SCHEDULED
Status: DISCONTINUED | OUTPATIENT
Start: 2023-10-28 | End: 2023-10-31 | Stop reason: HOSPADM

## 2023-10-28 RX ORDER — SODIUM CHLORIDE 0.9 % (FLUSH) 0.9 %
5-40 SYRINGE (ML) INJECTION PRN
Status: DISCONTINUED | OUTPATIENT
Start: 2023-10-28 | End: 2023-10-31 | Stop reason: HOSPADM

## 2023-10-28 RX ORDER — DEXTROSE MONOHYDRATE 100 MG/ML
INJECTION, SOLUTION INTRAVENOUS CONTINUOUS PRN
Status: DISCONTINUED | OUTPATIENT
Start: 2023-10-28 | End: 2023-10-31 | Stop reason: HOSPADM

## 2023-10-28 RX ORDER — ONDANSETRON 2 MG/ML
4 INJECTION INTRAMUSCULAR; INTRAVENOUS EVERY 6 HOURS PRN
Status: DISCONTINUED | OUTPATIENT
Start: 2023-10-28 | End: 2023-10-31 | Stop reason: HOSPADM

## 2023-10-28 RX ORDER — INSULIN LISPRO 100 [IU]/ML
0-8 INJECTION, SOLUTION INTRAVENOUS; SUBCUTANEOUS
Status: DISCONTINUED | OUTPATIENT
Start: 2023-10-28 | End: 2023-10-28

## 2023-10-28 RX ORDER — ACETAMINOPHEN 325 MG/1
650 TABLET ORAL EVERY 4 HOURS PRN
Status: DISCONTINUED | OUTPATIENT
Start: 2023-10-28 | End: 2023-10-28 | Stop reason: SDUPTHER

## 2023-10-28 RX ORDER — INSULIN LISPRO 100 [IU]/ML
0-4 INJECTION, SOLUTION INTRAVENOUS; SUBCUTANEOUS NIGHTLY
Status: DISCONTINUED | OUTPATIENT
Start: 2023-10-28 | End: 2023-10-28

## 2023-10-28 RX ORDER — POTASSIUM CHLORIDE 20 MEQ/1
40 TABLET, EXTENDED RELEASE ORAL ONCE
Status: COMPLETED | OUTPATIENT
Start: 2023-10-28 | End: 2023-10-28

## 2023-10-28 RX ORDER — LIDOCAINE 4 G/G
1 PATCH TOPICAL DAILY
Status: DISCONTINUED | OUTPATIENT
Start: 2023-10-28 | End: 2023-10-31 | Stop reason: HOSPADM

## 2023-10-28 RX ORDER — HYDROCODONE BITARTRATE AND ACETAMINOPHEN 5; 325 MG/1; MG/1
1 TABLET ORAL EVERY 8 HOURS PRN
COMMUNITY
Start: 2023-07-26

## 2023-10-28 RX ORDER — THIAMINE HYDROCHLORIDE 100 MG/ML
100 INJECTION, SOLUTION INTRAMUSCULAR; INTRAVENOUS ONCE
Status: COMPLETED | OUTPATIENT
Start: 2023-10-28 | End: 2023-10-28

## 2023-10-28 RX ORDER — METOPROLOL SUCCINATE 25 MG/1
25 TABLET, EXTENDED RELEASE ORAL DAILY
Status: DISCONTINUED | OUTPATIENT
Start: 2023-10-28 | End: 2023-10-31 | Stop reason: HOSPADM

## 2023-10-28 RX ORDER — ATORVASTATIN CALCIUM 40 MG/1
80 TABLET, FILM COATED ORAL NIGHTLY
Status: DISCONTINUED | OUTPATIENT
Start: 2023-10-28 | End: 2023-10-31 | Stop reason: HOSPADM

## 2023-10-28 RX ORDER — PANTOPRAZOLE SODIUM 40 MG/1
40 TABLET, DELAYED RELEASE ORAL
Status: DISCONTINUED | OUTPATIENT
Start: 2023-10-28 | End: 2023-10-30

## 2023-10-28 RX ORDER — NOREPINEPHRINE BITARTRATE 0.06 MG/ML
1-100 INJECTION, SOLUTION INTRAVENOUS CONTINUOUS
Status: DISCONTINUED | OUTPATIENT
Start: 2023-10-28 | End: 2023-10-31 | Stop reason: HOSPADM

## 2023-10-28 RX ORDER — ACETAMINOPHEN 325 MG/1
650 TABLET ORAL EVERY 6 HOURS PRN
Status: DISCONTINUED | OUTPATIENT
Start: 2023-10-28 | End: 2023-10-31 | Stop reason: HOSPADM

## 2023-10-28 RX ORDER — ACETAMINOPHEN 650 MG/1
650 SUPPOSITORY RECTAL EVERY 6 HOURS PRN
Status: DISCONTINUED | OUTPATIENT
Start: 2023-10-28 | End: 2023-10-31 | Stop reason: HOSPADM

## 2023-10-28 RX ADMIN — POTASSIUM CHLORIDE 40 MEQ: 1500 TABLET, EXTENDED RELEASE ORAL at 09:59

## 2023-10-28 RX ADMIN — PANTOPRAZOLE SODIUM 40 MG: 40 TABLET, DELAYED RELEASE ORAL at 08:22

## 2023-10-28 RX ADMIN — THIAMINE HYDROCHLORIDE 100 MG: 100 INJECTION, SOLUTION INTRAMUSCULAR; INTRAVENOUS at 09:59

## 2023-10-28 RX ADMIN — SODIUM CHLORIDE, PRESERVATIVE FREE 10 ML: 5 INJECTION INTRAVENOUS at 08:27

## 2023-10-28 RX ADMIN — SODIUM CHLORIDE: 9 INJECTION, SOLUTION INTRAVENOUS at 22:14

## 2023-10-28 RX ADMIN — SODIUM CHLORIDE: 9 INJECTION, SOLUTION INTRAVENOUS at 13:41

## 2023-10-28 RX ADMIN — SODIUM CHLORIDE, PRESERVATIVE FREE 20 ML: 5 INJECTION INTRAVENOUS at 20:19

## 2023-10-28 RX ADMIN — ASPIRIN 81 MG CHEWABLE TABLET 81 MG: 81 TABLET CHEWABLE at 08:23

## 2023-10-28 RX ADMIN — ATORVASTATIN CALCIUM 80 MG: 40 TABLET, FILM COATED ORAL at 20:16

## 2023-10-28 RX ADMIN — METOPROLOL SUCCINATE 25 MG: 25 TABLET, EXTENDED RELEASE ORAL at 08:22

## 2023-10-28 RX ADMIN — TICAGRELOR 90 MG: 90 TABLET ORAL at 08:22

## 2023-10-28 RX ADMIN — TICAGRELOR 90 MG: 90 TABLET ORAL at 20:16

## 2023-10-28 RX ADMIN — SODIUM CHLORIDE, PRESERVATIVE FREE 10 ML: 5 INJECTION INTRAVENOUS at 20:20

## 2023-10-28 ASSESSMENT — PAIN SCALES - GENERAL
PAINLEVEL_OUTOF10: 0
PAINLEVEL_OUTOF10: 5
PAINLEVEL_OUTOF10: 0
PAINLEVEL_OUTOF10: 0

## 2023-10-28 ASSESSMENT — ENCOUNTER SYMPTOMS
SHORTNESS OF BREATH: 1
CHEST TIGHTNESS: 1
EYES NEGATIVE: 1
GASTROINTESTINAL NEGATIVE: 1
ALLERGIC/IMMUNOLOGIC NEGATIVE: 1

## 2023-10-28 ASSESSMENT — HEART SCORE: ECG: 2

## 2023-10-28 NOTE — CODE DOCUMENTATION
Report given to cath lab, Dr. Golden Mayer at bedside and preparing to take to cath lab. Supervisor calling family.

## 2023-10-28 NOTE — ED TRIAGE NOTES
Pt states he has had generalized weakness, abdominal pain, left arm pain, SOB, chest pain, left shoulder pain, diaphoresis x1 day. Pt states when this started he [place].

## 2023-10-28 NOTE — ED PROVIDER NOTES
[facility] EMERGENCY DEPT  EMERGENCY DEPARTMENT HISTORY AND PHYSICAL EXAM      Date: 10/27/2023  Patient Name: Jelly Calvillo  MRN: [id number]  [address] [facility] 1960  Date of evaluation: 10/27/2023  Provider: Marvene Krabbe, MD   Note Started: 1:26 AM EDT 10/28/23    HISTORY OF PRESENT ILLNESS     Chief Complaint   Patient presents with    Chest Pain       History Provided By: Patient    HPI: Jelly Calvillo is a 66 y.o. male presents with chest pain. Patient reports left-sided chest pain that started about 1 hour prior to arrival.  This is associated with left arm pain and diaphoresis. It is constant. He denies any history of cardiac issues, denies any dyspnea. He also denies any drinking, smoking, or drug use. He has not felt chest pain like this before and denies any previous cardiac work-ups. PAST MEDICAL HISTORY   Past Medical History:  Past Medical History:   Diagnosis Date    Back pain     Cortisone shots    Hematuria     Prostate cancer screening        Past Surgical History:  History reviewed. No pertinent surgical history. Family History:  Family History   Problem Relation Age of Onset    Diabetes Mother     Hypertension Mother        Social History:  Social History     Tobacco Use    Smoking status: Every Day     Packs/day: .5     Types: Cigarettes, Cigars    Smokeless tobacco: Never   Substance Use Topics    Alcohol use: Yes    Drug use: Never       Allergies:  No Known Allergies    PCP: No primary care provider on file.     Current Meds:   Current Facility-Administered Medications   Medication Dose Route Frequency Provider Last Rate Last Admin    sodium chloride flush 0.9 % injection 5-40 mL  5-40 mL IntraVENous 2 times per day Aakash Young MD        sodium chloride flush 0.9 % injection 5-40 mL  5-40 mL IntraVENous PRN Aakash Young MD        0.9 % sodium chloride infusion   IntraVENous PRN Aakash Young MD        acetaminophen (TYLENOL) tablet 650 mg  650 mg Oral Q4H PRN Aakash Young MD

## 2023-10-28 NOTE — H&P
pressors for the blood pressure as it was low, otherwise he is stable. As per the nursing staff they were able to trying to wean off the pressors slowly. And currently his condition is stable. Wife is at bedside now, she did not have a chance to talk to anybody until this time. I answered all her questions to her satisfaction. Past Medical History:   Diagnosis Date    Back pain     Cortisone shots    Hematuria     Prostate cancer screening      History reviewed. No pertinent surgical history.   Family History   Problem Relation Age of Onset    Diabetes Mother     Hypertension Mother       Social History     Tobacco Use    Smoking status: Every Day     Packs/day: .5     Types: Cigarettes, Cigars    Smokeless tobacco: Never   Substance Use Topics    Alcohol use: Yes       No Known Allergies     Current Facility-Administered Medications   Medication Dose Route Frequency Provider Last Rate Last Admin    sodium chloride flush 0.9 % injection 5-40 mL  5-40 mL IntraVENous 2 times per day Reina Brooks MD        sodium chloride flush 0.9 % injection 5-40 mL  5-40 mL IntraVENous PRN Reina Brooks MD        0.9 % sodium chloride infusion   IntraVENous PRN Reina Brooks MD        acetaminophen (TYLENOL) tablet 650 mg  650 mg Oral Q4H PRN Reina Brooks MD        ondansetron [facility]) injection 4 mg  4 mg IntraVENous Q6H PRN Reina Brooks MD        norepinephrine (LEVOPHED) 16 mg in sodium chloride 0.9 % 250 mL infusion  1-100 mcg/min IntraVENous Continuous Reina Brooks MD 5.6 mL/hr at 10/28/23 0314 6 mcg/min at 10/28/23 0314    metoprolol succinate (TOPROL XL) extended release tablet 25 mg  25 mg Oral Daily Faiza Salinas MD        atorvastatin (LIPITOR) tablet 80 mg  80 mg Oral Nightly Faiza Salinas MD        sodium chloride flush 0.9 % injection 5-40 mL  5-40 mL IntraVENous 2 times per day Faiza Salinas MD        sodium chloride flush 0.9 % injection 5-40 mL  5-40 mL

## 2023-10-29 ENCOUNTER — APPOINTMENT (OUTPATIENT)
Facility: HOSPITAL | Age: 63
DRG: 321 | End: 2023-10-29
Payer: MEDICARE

## 2023-10-29 LAB
ALBUMIN SERPL-MCNC: 3.1 G/DL (ref 3.5–5)
ALBUMIN/GLOB SERPL: 1.1 (ref 1.1–2.2)
ALP SERPL-CCNC: 48 U/L (ref 45–117)
ALT SERPL-CCNC: 71 U/L (ref 12–78)
AMPHET UR QL SCN: NEGATIVE
ANION GAP SERPL CALC-SCNC: 3 MMOL/L (ref 5–15)
AST SERPL W P-5'-P-CCNC: 106 U/L (ref 15–37)
BARBITURATES UR QL SCN: NEGATIVE
BENZODIAZ UR QL: POSITIVE
BILIRUB SERPL-MCNC: 0.6 MG/DL (ref 0.2–1)
BUN SERPL-MCNC: 14 MG/DL (ref 6–20)
BUN/CREAT SERPL: 14 (ref 12–20)
CA-I BLD-MCNC: 8.1 MG/DL (ref 8.5–10.1)
CANNABINOIDS UR QL SCN: NEGATIVE
CHLORIDE SERPL-SCNC: 114 MMOL/L (ref 97–108)
CO2 SERPL-SCNC: 24 MMOL/L (ref 21–32)
COCAINE UR QL SCN: NEGATIVE
CREAT SERPL-MCNC: 0.99 MG/DL (ref 0.7–1.3)
ECHO AO ASC DIAM: 2.7 CM
ECHO AO ASCENDING AORTA INDEX: 1.29 CM/M2
ECHO AO ROOT DIAM: 3.3 CM
ECHO AO ROOT INDEX: 1.57 CM/M2
ECHO AV AREA PEAK VELOCITY: 2.5 CM2
ECHO AV AREA VTI: 2.3 CM2
ECHO AV AREA/BSA PEAK VELOCITY: 1.2 CM2/M2
ECHO AV AREA/BSA VTI: 1.1 CM2/M2
ECHO AV MEAN GRADIENT: 4 MMHG
ECHO AV MEAN VELOCITY: 0.9 M/S
ECHO AV PEAK GRADIENT: 6 MMHG
ECHO AV PEAK VELOCITY: 1.3 M/S
ECHO AV VELOCITY RATIO: 0.77
ECHO AV VTI: 23.9 CM
ECHO BSA: 2.11 M2
ECHO IVC EXP: 1.2 CM
ECHO IVC INSP: 0.8 CM
ECHO LA AREA 2C: 16 CM2
ECHO LA AREA 4C: 14.7 CM2
ECHO LA DIAMETER INDEX: 1.38 CM/M2
ECHO LA DIAMETER: 2.9 CM
ECHO LA MAJOR AXIS: 4.5 CM
ECHO LA MINOR AXIS: 5.6 CM
ECHO LA TO AORTIC ROOT RATIO: 0.88
ECHO LA VOL 2C: 36 ML (ref 18–58)
ECHO LA VOL 4C: 41 ML (ref 18–58)
ECHO LA VOLUME INDEX A2C: 17 ML/M2 (ref 16–34)
ECHO LA VOLUME INDEX A4C: 20 ML/M2 (ref 16–34)
ECHO LV E' LATERAL VELOCITY: 10 CM/S
ECHO LV E' SEPTAL VELOCITY: 9 CM/S
ECHO LV EDV A2C: 109 ML
ECHO LV EDV A4C: 87 ML
ECHO LV EDV INDEX A4C: 41 ML/M2
ECHO LV EDV NDEX A2C: 52 ML/M2
ECHO LV EJECTION FRACTION A2C: 51 %
ECHO LV EJECTION FRACTION A4C: 48 %
ECHO LV EJECTION FRACTION BIPLANE: 50 % (ref 55–100)
ECHO LV ESV A2C: 53 ML
ECHO LV ESV A4C: 46 ML
ECHO LV ESV INDEX A2C: 25 ML/M2
ECHO LV ESV INDEX A4C: 22 ML/M2
ECHO LV FRACTIONAL SHORTENING: 38 % (ref 28–44)
ECHO LV INTERNAL DIMENSION DIASTOLE INDEX: 2.29 CM/M2
ECHO LV INTERNAL DIMENSION DIASTOLIC: 4.8 CM (ref 4.2–5.9)
ECHO LV INTERNAL DIMENSION SYSTOLIC INDEX: 1.43 CM/M2
ECHO LV INTERNAL DIMENSION SYSTOLIC: 3 CM
ECHO LV IVSD: 0.9 CM (ref 0.6–1)
ECHO LV MASS 2D: 147.8 G (ref 88–224)
ECHO LV MASS INDEX 2D: 70.4 G/M2 (ref 49–115)
ECHO LV POSTERIOR WALL DIASTOLIC: 0.9 CM (ref 0.6–1)
ECHO LV RELATIVE WALL THICKNESS RATIO: 0.38
ECHO LVOT AREA: 3.1 CM2
ECHO LVOT AV VTI INDEX: 0.74
ECHO LVOT DIAM: 2 CM
ECHO LVOT MEAN GRADIENT: 2 MMHG
ECHO LVOT PEAK GRADIENT: 4 MMHG
ECHO LVOT PEAK VELOCITY: 1 M/S
ECHO LVOT STROKE VOLUME INDEX: 26.6 ML/M2
ECHO LVOT SV: 55.9 ML
ECHO LVOT VTI: 17.8 CM
ECHO MV A VELOCITY: 0.96 M/S
ECHO MV E DECELERATION TIME (DT): 111 MS
ECHO MV E VELOCITY: 0.94 M/S
ECHO MV E/A RATIO: 0.98
ECHO MV E/E' LATERAL: 9.4
ECHO MV E/E' RATIO (AVERAGED): 9.92
ECHO MV E/E' SEPTAL: 10.44
ECHO RA AREA 4C: 11.1 CM2
ECHO RA END SYSTOLIC VOLUME APICAL 4 CHAMBER INDEX BSA: 12 ML/M2
ECHO RA VOLUME: 26 ML
ECHO RV BASAL DIMENSION: 3.4 CM
ECHO RV FREE WALL PEAK S': 11 CM/S
ECHO RV TAPSE: 2 CM (ref 1.7–?)
ECHO TV REGURGITANT MAX VELOCITY: 1.82 M/S
ECHO TV REGURGITANT PEAK GRADIENT: 13 MMHG
EKG ATRIAL RATE: 74 BPM
EKG DIAGNOSIS: NORMAL
EKG P AXIS: 56 DEGREES
EKG P-R INTERVAL: 168 MS
EKG Q-T INTERVAL: 466 MS
EKG QRS DURATION: 78 MS
EKG QTC CALCULATION (BAZETT): 517 MS
EKG R AXIS: 56 DEGREES
EKG T AXIS: 148 DEGREES
EKG VENTRICULAR RATE: 74 BPM
ERYTHROCYTE [DISTWIDTH] IN BLOOD BY AUTOMATED COUNT: 14.3 % (ref 11.5–14.5)
GLOBULIN SER CALC-MCNC: 2.7 G/DL (ref 2–4)
GLUCOSE SERPL-MCNC: 98 MG/DL (ref 65–100)
HCT VFR BLD AUTO: 34.4 % (ref 36.6–50.3)
HGB BLD-MCNC: 11.4 G/DL (ref 12.1–17)
LACTATE SERPL-SCNC: 0.9 MMOL/L (ref 0.4–2)
Lab: ABNORMAL
MAGNESIUM SERPL-MCNC: 2.1 MG/DL (ref 1.6–2.4)
MCH RBC QN AUTO: 31.8 PG (ref 26–34)
MCHC RBC AUTO-ENTMCNC: 33.1 G/DL (ref 30–36.5)
MCV RBC AUTO: 96.1 FL (ref 80–99)
METHADONE UR QL: NEGATIVE
NRBC # BLD: 0 K/UL (ref 0–0.01)
NRBC BLD-RTO: 0 PER 100 WBC
OPIATES UR QL: POSITIVE
PCP UR QL: NEGATIVE
PHOSPHATE SERPL-MCNC: 1.5 MG/DL (ref 2.6–4.7)
PLATELET # BLD AUTO: 193 K/UL (ref 150–400)
PMV BLD AUTO: 11.3 FL (ref 8.9–12.9)
POTASSIUM SERPL-SCNC: 3.8 MMOL/L (ref 3.5–5.1)
PROT SERPL-MCNC: 5.8 G/DL (ref 6.4–8.2)
RBC # BLD AUTO: 3.58 M/UL (ref 4.1–5.7)
SODIUM SERPL-SCNC: 141 MMOL/L (ref 136–145)
TROPONIN I SERPL HS-MCNC: ABNORMAL NG/L (ref 0–76)
TROPONIN I SERPL HS-MCNC: ABNORMAL NG/L (ref 0–76)
WBC # BLD AUTO: 12.8 K/UL (ref 4.1–11.1)

## 2023-10-29 PROCEDURE — 80053 COMPREHEN METABOLIC PANEL: CPT

## 2023-10-29 PROCEDURE — 84484 ASSAY OF TROPONIN QUANT: CPT

## 2023-10-29 PROCEDURE — 84100 ASSAY OF PHOSPHORUS: CPT

## 2023-10-29 PROCEDURE — 6370000000 HC RX 637 (ALT 250 FOR IP): Performed by: INTERNAL MEDICINE

## 2023-10-29 PROCEDURE — 6370000000 HC RX 637 (ALT 250 FOR IP): Performed by: HOSPITALIST

## 2023-10-29 PROCEDURE — 36415 COLL VENOUS BLD VENIPUNCTURE: CPT

## 2023-10-29 PROCEDURE — 1100000000 HC RM PRIVATE

## 2023-10-29 PROCEDURE — 85027 COMPLETE CBC AUTOMATED: CPT

## 2023-10-29 PROCEDURE — 2580000003 HC RX 258: Performed by: INTERNAL MEDICINE

## 2023-10-29 PROCEDURE — 93005 ELECTROCARDIOGRAM TRACING: CPT | Performed by: INTERNAL MEDICINE

## 2023-10-29 PROCEDURE — 2580000003 HC RX 258: Performed by: HOSPITALIST

## 2023-10-29 PROCEDURE — 2000000000 HC ICU R&B

## 2023-10-29 PROCEDURE — 83605 ASSAY OF LACTIC ACID: CPT

## 2023-10-29 PROCEDURE — 83735 ASSAY OF MAGNESIUM: CPT

## 2023-10-29 PROCEDURE — 94761 N-INVAS EAR/PLS OXIMETRY MLT: CPT

## 2023-10-29 PROCEDURE — 71045 X-RAY EXAM CHEST 1 VIEW: CPT

## 2023-10-29 RX ORDER — POTASSIUM CHLORIDE 20 MEQ/1
40 TABLET, EXTENDED RELEASE ORAL ONCE
Status: COMPLETED | OUTPATIENT
Start: 2023-10-29 | End: 2023-10-29

## 2023-10-29 RX ADMIN — POTASSIUM & SODIUM PHOSPHATES POWDER PACK 280-160-250 MG 500 MG: 280-160-250 PACK at 11:44

## 2023-10-29 RX ADMIN — METOPROLOL SUCCINATE 25 MG: 25 TABLET, EXTENDED RELEASE ORAL at 08:47

## 2023-10-29 RX ADMIN — SODIUM CHLORIDE, PRESERVATIVE FREE 10 ML: 5 INJECTION INTRAVENOUS at 08:49

## 2023-10-29 RX ADMIN — POTASSIUM & SODIUM PHOSPHATES POWDER PACK 280-160-250 MG 500 MG: 280-160-250 PACK at 12:26

## 2023-10-29 RX ADMIN — ATORVASTATIN CALCIUM 80 MG: 40 TABLET, FILM COATED ORAL at 20:02

## 2023-10-29 RX ADMIN — PANTOPRAZOLE SODIUM 40 MG: 40 TABLET, DELAYED RELEASE ORAL at 06:43

## 2023-10-29 RX ADMIN — TICAGRELOR 90 MG: 90 TABLET ORAL at 20:03

## 2023-10-29 RX ADMIN — TICAGRELOR 90 MG: 90 TABLET ORAL at 08:47

## 2023-10-29 RX ADMIN — SODIUM CHLORIDE: 9 INJECTION, SOLUTION INTRAVENOUS at 20:05

## 2023-10-29 RX ADMIN — POTASSIUM & SODIUM PHOSPHATES POWDER PACK 280-160-250 MG 500 MG: 280-160-250 PACK at 09:45

## 2023-10-29 RX ADMIN — POTASSIUM CHLORIDE 40 MEQ: 1500 TABLET, EXTENDED RELEASE ORAL at 09:46

## 2023-10-29 RX ADMIN — ASPIRIN 81 MG CHEWABLE TABLET 81 MG: 81 TABLET CHEWABLE at 08:47

## 2023-10-29 ASSESSMENT — PAIN SCALES - GENERAL
PAINLEVEL_OUTOF10: 0

## 2023-10-29 ASSESSMENT — ENCOUNTER SYMPTOMS
ALLERGIC/IMMUNOLOGIC NEGATIVE: 1
SHORTNESS OF BREATH: 1
CHEST TIGHTNESS: 1
GASTROINTESTINAL NEGATIVE: 1
EYES NEGATIVE: 1

## 2023-10-29 NOTE — CARE COORDINATION
10/29/23 1759   Service Assessment   Patient Orientation Alert and Oriented   Cognition Alert   History Provided By Patient   Primary [address] [employer] [facility]   PCP Verified by CM No  (denies)   Prior Functional Level Independent in ADLs/IADLs   Current Functional Level Independent in ADLs/IADLs   Can patient return to prior living arrangement Yes   Ability to make needs known: Good   Family able to assist with home care needs: Yes   Would you like for me to discuss the discharge plan with any other family members/significant others, and if so, who? No   Financial Resources None   [employer] None   Social/Functional History   Lives With Spouse     Met with the pt at bedside. Denied pcp. He lives with his wife Yemi Ling who will transport at [place]. He is able to arrange his own transportation at [place].

## 2023-10-30 LAB
ALBUMIN SERPL-MCNC: 3 G/DL (ref 3.5–5)
ALBUMIN/GLOB SERPL: 1 (ref 1.1–2.2)
ALP SERPL-CCNC: 55 U/L (ref 45–117)
ALT SERPL-CCNC: 60 U/L (ref 12–78)
ANION GAP SERPL CALC-SCNC: 5 MMOL/L (ref 5–15)
AST SERPL W P-5'-P-CCNC: 56 U/L (ref 15–37)
BILIRUB SERPL-MCNC: 1 MG/DL (ref 0.2–1)
BUN SERPL-MCNC: 12 MG/DL (ref 6–20)
BUN/CREAT SERPL: 14 (ref 12–20)
CA-I BLD-MCNC: 8.2 MG/DL (ref 8.5–10.1)
CHLORIDE SERPL-SCNC: 113 MMOL/L (ref 97–108)
CO2 SERPL-SCNC: 23 MMOL/L (ref 21–32)
CREAT SERPL-MCNC: 0.88 MG/DL (ref 0.7–1.3)
ERYTHROCYTE [DISTWIDTH] IN BLOOD BY AUTOMATED COUNT: 13.8 % (ref 11.5–14.5)
EST. AVERAGE GLUCOSE BLD GHB EST-MCNC: 108 MG/DL
GLOBULIN SER CALC-MCNC: 3.1 G/DL (ref 2–4)
GLUCOSE SERPL-MCNC: 97 MG/DL (ref 65–100)
HBA1C MFR BLD: 5.4 % (ref 4–5.6)
HCT VFR BLD AUTO: 33.3 % (ref 36.6–50.3)
HGB BLD-MCNC: 11.1 G/DL (ref 12.1–17)
MAGNESIUM SERPL-MCNC: 2.1 MG/DL (ref 1.6–2.4)
MCH RBC QN AUTO: 31.4 PG (ref 26–34)
MCHC RBC AUTO-ENTMCNC: 33.3 G/DL (ref 30–36.5)
MCV RBC AUTO: 94.1 FL (ref 80–99)
NRBC # BLD: 0 K/UL (ref 0–0.01)
NRBC BLD-RTO: 0 PER 100 WBC
PLATELET # BLD AUTO: 187 K/UL (ref 150–400)
PMV BLD AUTO: 10.9 FL (ref 8.9–12.9)
POTASSIUM SERPL-SCNC: 3.7 MMOL/L (ref 3.5–5.1)
PROT SERPL-MCNC: 6.1 G/DL (ref 6.4–8.2)
RBC # BLD AUTO: 3.54 M/UL (ref 4.1–5.7)
SODIUM SERPL-SCNC: 141 MMOL/L (ref 136–145)
TROPONIN I SERPL HS-MCNC: 4972 NG/L (ref 0–76)
WBC # BLD AUTO: 9.9 K/UL (ref 4.1–11.1)

## 2023-10-30 PROCEDURE — 6370000000 HC RX 637 (ALT 250 FOR IP): Performed by: INTERNAL MEDICINE

## 2023-10-30 PROCEDURE — 85027 COMPLETE CBC AUTOMATED: CPT

## 2023-10-30 PROCEDURE — 2580000003 HC RX 258: Performed by: HOSPITALIST

## 2023-10-30 PROCEDURE — 83735 ASSAY OF MAGNESIUM: CPT

## 2023-10-30 PROCEDURE — 36415 COLL VENOUS BLD VENIPUNCTURE: CPT

## 2023-10-30 PROCEDURE — 84484 ASSAY OF TROPONIN QUANT: CPT

## 2023-10-30 PROCEDURE — 2580000003 HC RX 258: Performed by: INTERNAL MEDICINE

## 2023-10-30 PROCEDURE — 6370000000 HC RX 637 (ALT 250 FOR IP): Performed by: HOSPITALIST

## 2023-10-30 PROCEDURE — 80053 COMPREHEN METABOLIC PANEL: CPT

## 2023-10-30 PROCEDURE — 1100000000 HC RM PRIVATE

## 2023-10-30 PROCEDURE — 83036 HEMOGLOBIN GLYCOSYLATED A1C: CPT

## 2023-10-30 RX ADMIN — SODIUM CHLORIDE: 9 INJECTION, SOLUTION INTRAVENOUS at 05:11

## 2023-10-30 RX ADMIN — TICAGRELOR 90 MG: 90 TABLET ORAL at 08:56

## 2023-10-30 RX ADMIN — METOPROLOL SUCCINATE 25 MG: 25 TABLET, EXTENDED RELEASE ORAL at 08:56

## 2023-10-30 RX ADMIN — SODIUM CHLORIDE, PRESERVATIVE FREE 10 ML: 5 INJECTION INTRAVENOUS at 21:02

## 2023-10-30 RX ADMIN — POTASSIUM & SODIUM PHOSPHATES POWDER PACK 280-160-250 MG 250 MG: 280-160-250 PACK at 20:49

## 2023-10-30 RX ADMIN — SODIUM CHLORIDE, PRESERVATIVE FREE 10 ML: 5 INJECTION INTRAVENOUS at 21:01

## 2023-10-30 RX ADMIN — ATORVASTATIN CALCIUM 80 MG: 40 TABLET, FILM COATED ORAL at 20:49

## 2023-10-30 RX ADMIN — TICAGRELOR 90 MG: 90 TABLET ORAL at 20:49

## 2023-10-30 RX ADMIN — SODIUM CHLORIDE, PRESERVATIVE FREE 10 ML: 5 INJECTION INTRAVENOUS at 09:01

## 2023-10-30 RX ADMIN — ASPIRIN 81 MG CHEWABLE TABLET 81 MG: 81 TABLET CHEWABLE at 08:56

## 2023-10-30 ASSESSMENT — ENCOUNTER SYMPTOMS
ALLERGIC/IMMUNOLOGIC NEGATIVE: 1
EYES NEGATIVE: 1
GASTROINTESTINAL NEGATIVE: 1
SHORTNESS OF BREATH: 1
CHEST TIGHTNESS: 1

## 2023-10-30 ASSESSMENT — PAIN SCALES - GENERAL
PAINLEVEL_OUTOF10: 0

## 2023-10-30 NOTE — DISCHARGE INSTRUCTIONS
quickly as well as reduce the risks of heart problems in the future. Cardiac rehabilitation services are provided at:    [address], [facility]  [phone], [address] [place] [address]    Your information has been forwarded to the staff at this facility. Someone will call you in approximately one week to schedule  your initial assessment to begin cardiac rehabilitation.

## 2023-10-31 VITALS
HEART RATE: 59 BPM | RESPIRATION RATE: 22 BRPM | DIASTOLIC BLOOD PRESSURE: 74 MMHG | SYSTOLIC BLOOD PRESSURE: 112 MMHG | WEIGHT: 193.34 LBS | OXYGEN SATURATION: 96 % | TEMPERATURE: 98.9 F | BODY MASS INDEX: 26.19 KG/M2 | HEIGHT: 72 IN

## 2023-10-31 LAB
ANION GAP SERPL CALC-SCNC: 8 MMOL/L (ref 5–15)
BASOPHILS # BLD: 0 K/UL (ref 0–0.1)
BASOPHILS NFR BLD: 0 % (ref 0–1)
BUN SERPL-MCNC: 11 MG/DL (ref 6–20)
BUN/CREAT SERPL: 14 (ref 12–20)
CA-I BLD-MCNC: 7.4 MG/DL (ref 8.5–10.1)
CHLORIDE SERPL-SCNC: 116 MMOL/L (ref 97–108)
CO2 SERPL-SCNC: 21 MMOL/L (ref 21–32)
CREAT SERPL-MCNC: 0.76 MG/DL (ref 0.7–1.3)
DIFFERENTIAL METHOD BLD: ABNORMAL
EOSINOPHIL # BLD: 0 K/UL (ref 0–0.4)
EOSINOPHIL NFR BLD: 1 % (ref 0–7)
ERYTHROCYTE [DISTWIDTH] IN BLOOD BY AUTOMATED COUNT: 13.2 % (ref 11.5–14.5)
GLUCOSE SERPL-MCNC: 81 MG/DL (ref 65–100)
HCT VFR BLD AUTO: 35.9 % (ref 36.6–50.3)
HGB BLD-MCNC: 12 G/DL (ref 12.1–17)
IMM GRANULOCYTES # BLD AUTO: 0 K/UL (ref 0–0.04)
IMM GRANULOCYTES NFR BLD AUTO: 0 % (ref 0–0.5)
LYMPHOCYTES # BLD: 1.8 K/UL (ref 0.8–3.5)
LYMPHOCYTES NFR BLD: 21 % (ref 12–49)
MCH RBC QN AUTO: 31.3 PG (ref 26–34)
MCHC RBC AUTO-ENTMCNC: 33.4 G/DL (ref 30–36.5)
MCV RBC AUTO: 93.5 FL (ref 80–99)
MONOCYTES # BLD: 0.6 K/UL (ref 0–1)
MONOCYTES NFR BLD: 7 % (ref 5–13)
NEUTS SEG # BLD: 6 K/UL (ref 1.8–8)
NEUTS SEG NFR BLD: 71 % (ref 32–75)
NRBC # BLD: 0 K/UL (ref 0–0.01)
NRBC BLD-RTO: 0 PER 100 WBC
PHOSPHATE SERPL-MCNC: 3.2 MG/DL (ref 2.6–4.7)
PLATELET # BLD AUTO: 205 K/UL (ref 150–400)
PMV BLD AUTO: 11.3 FL (ref 8.9–12.9)
POTASSIUM SERPL-SCNC: 3.3 MMOL/L (ref 3.5–5.1)
RBC # BLD AUTO: 3.84 M/UL (ref 4.1–5.7)
SODIUM SERPL-SCNC: 145 MMOL/L (ref 136–145)
WBC # BLD AUTO: 8.5 K/UL (ref 4.1–11.1)

## 2023-10-31 PROCEDURE — 6370000000 HC RX 637 (ALT 250 FOR IP): Performed by: INTERNAL MEDICINE

## 2023-10-31 PROCEDURE — 85025 COMPLETE CBC W/AUTO DIFF WBC: CPT

## 2023-10-31 PROCEDURE — 36415 COLL VENOUS BLD VENIPUNCTURE: CPT

## 2023-10-31 PROCEDURE — 6370000000 HC RX 637 (ALT 250 FOR IP): Performed by: HOSPITALIST

## 2023-10-31 PROCEDURE — 2580000003 HC RX 258: Performed by: INTERNAL MEDICINE

## 2023-10-31 PROCEDURE — 84100 ASSAY OF PHOSPHORUS: CPT

## 2023-10-31 PROCEDURE — 2580000003 HC RX 258: Performed by: HOSPITALIST

## 2023-10-31 PROCEDURE — 80048 BASIC METABOLIC PNL TOTAL CA: CPT

## 2023-10-31 RX ORDER — POTASSIUM CHLORIDE 20 MEQ/1
40 TABLET, EXTENDED RELEASE ORAL ONCE
Status: DISCONTINUED | OUTPATIENT
Start: 2023-10-31 | End: 2023-10-31 | Stop reason: HOSPADM

## 2023-10-31 RX ORDER — METOPROLOL SUCCINATE 25 MG/1
25 TABLET, EXTENDED RELEASE ORAL DAILY
Qty: 30 TABLET | Refills: 3 | Status: SHIPPED | OUTPATIENT
Start: 2023-11-01

## 2023-10-31 RX ORDER — ASPIRIN 81 MG/1
81 TABLET, CHEWABLE ORAL DAILY
Qty: 30 TABLET | Refills: 3 | Status: SHIPPED | OUTPATIENT
Start: 2023-11-01

## 2023-10-31 RX ORDER — ATORVASTATIN CALCIUM 80 MG/1
80 TABLET, FILM COATED ORAL NIGHTLY
Qty: 30 TABLET | Refills: 3 | Status: SHIPPED | OUTPATIENT
Start: 2023-10-31 | End: 2023-10-31 | Stop reason: SDUPTHER

## 2023-10-31 RX ORDER — METOPROLOL SUCCINATE 25 MG/1
25 TABLET, EXTENDED RELEASE ORAL DAILY
Qty: 30 TABLET | Refills: 3 | Status: SHIPPED | OUTPATIENT
Start: 2023-11-01 | End: 2023-10-31 | Stop reason: SDUPTHER

## 2023-10-31 RX ORDER — ASPIRIN 81 MG/1
81 TABLET, CHEWABLE ORAL DAILY
Qty: 30 TABLET | Refills: 3 | Status: SHIPPED | OUTPATIENT
Start: 2023-11-01 | End: 2023-10-31 | Stop reason: SDUPTHER

## 2023-10-31 RX ORDER — ATORVASTATIN CALCIUM 80 MG/1
80 TABLET, FILM COATED ORAL NIGHTLY
Qty: 30 TABLET | Refills: 3 | Status: SHIPPED | OUTPATIENT
Start: 2023-10-31

## 2023-10-31 RX ADMIN — METOPROLOL SUCCINATE 25 MG: 25 TABLET, EXTENDED RELEASE ORAL at 08:52

## 2023-10-31 RX ADMIN — ASPIRIN 81 MG CHEWABLE TABLET 81 MG: 81 TABLET CHEWABLE at 08:51

## 2023-10-31 RX ADMIN — SODIUM CHLORIDE, PRESERVATIVE FREE 10 ML: 5 INJECTION INTRAVENOUS at 08:58

## 2023-10-31 RX ADMIN — POTASSIUM & SODIUM PHOSPHATES POWDER PACK 280-160-250 MG 250 MG: 280-160-250 PACK at 08:51

## 2023-10-31 RX ADMIN — TICAGRELOR 90 MG: 90 TABLET ORAL at 08:52

## 2023-10-31 RX ADMIN — SODIUM CHLORIDE, PRESERVATIVE FREE 10 ML: 5 INJECTION INTRAVENOUS at 08:57

## 2023-10-31 ASSESSMENT — ENCOUNTER SYMPTOMS
ALLERGIC/IMMUNOLOGIC NEGATIVE: 1
GASTROINTESTINAL NEGATIVE: 1
EYES NEGATIVE: 1

## 2023-10-31 NOTE — DISCHARGE SUMMARY
Hospitalist Discharge Summary     Patient ID:  Jesus Snyder  [id number]  66 y.o.  1960  10/27/2023    PCP on record: Aamir Aguilar MD    Admit date: 10/27/2023  Discharge date and time: 10/31/2023    DISCHARGE DIAGNOSIS:  Acute MI  Cardiac shock  Ventricular tachycardia  Cardiac arrest needing CPR  JEREMY resolved  Lactic acidosis resolved  Prediabetic  Chronic back pain    CONSULTATIONS:  IP CONSULT TO CARDIOLOGY  IP CONSULT TO CARDIOLOGY  IP CONSULT TO CASE MANAGEMENT    Excerpted HPI from H&P   Jesus Samples is  66 y.o. male with a history of chronic back pain on disability with no significant medical issues is brought to the emergency room by a friend as he was not feeling good. As per the wife who was at work the friend and patient were at the racetrack walking, he just not feeling good. He started complaining of generalized weakness associated with abdominal pain and left arm, it is associated with shortness of breath he felt chest tightness and left shoulder pain and diaphoretic it is happening for 1 day intermittently worse with activity. So friend brought him to the emergency room. On arrival he started worked up, vitals were stable on arrival.  Initial troponin came back elevated suggestive of non-ST elevation MI with patient clinical picture started on IV heparin. Patient suddenly developed ventricular tachycardia on the monitor, EKG was done and given mag sulfate, at 21:34 patient lost pulse and called for a CODE BLUE and immediately CPR is started. He was given magnesium sulfate and amiodarone as he was already found with ventricular tachycardia, was given 1 dose of epinephrine. ROSC was obtained in few minutes, STEMI alert was called. Patient was taken to the cardiac Cath Lab directly, on cardiac catheterization they found the lesion 90% at the origin of LAD with thrombus that was cleaned and stented. Patient is admitted to ICU at this time.   He is needing pressors for

## 2023-10-31 NOTE — CARE COORDINATION
Transition of Care Plan:    RUR:   Prior Level of Functioning: Independent  Disposition: Home self care  Follow up appointments: PCP, Cardiology, and Cardiac Rehab  DME needed: N/A  Transportation at discharge: Spouse  IM/IMM Medicare/ letter given: Medicare pt has received, reviewed, and signed 2nd IM letter informing them of their right to appeal the discharge. Signed copy has been placed on pt bedside chart. Is patient a  and connected with [facility]? N/A  If yes, was [place] transfer form completed and [facility] notified? N/A  Caregiver Contact: Spouse  Discharge Caregiver contacted prior to discharge? Patient contacted spouse himself  Care Conference needed? N/A  Barriers to discharge: N/A    Spoke w/patient and spouse regarding pharmacy and prescription coverage. Patient voiced \"I uses [facility]. Don't have any issues w/getting medications. Juwan Celeste MSW

## 2023-11-01 ENCOUNTER — FOLLOWUP TELEPHONE ENCOUNTER (OUTPATIENT)
Facility: HOSPITAL | Age: 63
End: 2023-11-01

## 2023-11-01 NOTE — FLOWSHEET NOTE
Patient is very appreciative of his care. States he picked up his medications last night and is taking them as prescribed. Patient scheduled all his follow-up appts this morning:  Dr. Darius Pinead 11/15  PCP 11/7      Patient states he cannot find his Medicare card. Offered to transfer him to patient advocate. He states he thinks he lost it before he came to the hospital.  Provided patient with the [phone] to have medicare card replaced.

## 2024-01-04 ENCOUNTER — OFFICE VISIT (OUTPATIENT)
Age: 64
End: 2024-01-04
Payer: MEDICARE

## 2024-01-04 VITALS
WEIGHT: 186 LBS | BODY MASS INDEX: 25.19 KG/M2 | OXYGEN SATURATION: 99 % | HEART RATE: 69 BPM | HEIGHT: 72 IN | DIASTOLIC BLOOD PRESSURE: 80 MMHG | SYSTOLIC BLOOD PRESSURE: 106 MMHG

## 2024-01-04 DIAGNOSIS — I46.9 CARDIAC ARREST (HCC): ICD-10-CM

## 2024-01-04 DIAGNOSIS — I25.10 ATHEROSCLEROSIS OF NATIVE CORONARY ARTERY OF NATIVE HEART WITHOUT ANGINA PECTORIS: ICD-10-CM

## 2024-01-04 DIAGNOSIS — I21.3 ST ELEVATION MYOCARDIAL INFARCTION (STEMI), UNSPECIFIED ARTERY (HCC): Primary | ICD-10-CM

## 2024-01-04 PROCEDURE — 3017F COLORECTAL CA SCREEN DOC REV: CPT | Performed by: INTERNAL MEDICINE

## 2024-01-04 PROCEDURE — G8427 DOCREV CUR MEDS BY ELIG CLIN: HCPCS | Performed by: INTERNAL MEDICINE

## 2024-01-04 PROCEDURE — 1036F TOBACCO NON-USER: CPT | Performed by: INTERNAL MEDICINE

## 2024-01-04 PROCEDURE — 93000 ELECTROCARDIOGRAM COMPLETE: CPT | Performed by: INTERNAL MEDICINE

## 2024-01-04 PROCEDURE — G8419 CALC BMI OUT NRM PARAM NOF/U: HCPCS | Performed by: INTERNAL MEDICINE

## 2024-01-04 PROCEDURE — 99204 OFFICE O/P NEW MOD 45 MIN: CPT | Performed by: INTERNAL MEDICINE

## 2024-01-04 PROCEDURE — G8484 FLU IMMUNIZE NO ADMIN: HCPCS | Performed by: INTERNAL MEDICINE

## 2024-01-04 NOTE — PROGRESS NOTES
Rudolph Reed presents today for   Chief Complaint   Patient presents with    New Patient     Est care due to STEMI at [facility] 10/27-10/31. Pt has no concerns        Rudolph Reed preferred language for health care discussion is english/other.    Is someone accompanying this pt? no    Is the patient using any DME equipment during OV? no    Depression Screening:  Depression: Not on file        Learning Assessment:  No question data found.       Pt currently taking Anticoagulant therapy? no    Pt currently taking Antiplatelet therapy ? ASA 81 mg 1x daily and Brilinta 90 mg 2x daily       Coordination of Care:  1. Have you been to the ER, urgent care clinic since your last visit? Hospitalized since your last visit? no    2. Have you seen or consulted any other health care providers outside of the [facility] since your last visit? Include any pap smears or colon screening. no    
nitrates)  RTC ~5 months    Thank you for allowing me to participate in the care of your patient, please do not hesitate to call with questions or concerns.    Regards,    Kendra Kelsey, DO

## 2024-05-16 ENCOUNTER — OFFICE VISIT (OUTPATIENT)
Age: 64
End: 2024-05-16
Payer: MEDICARE

## 2024-05-16 VITALS
SYSTOLIC BLOOD PRESSURE: 122 MMHG | DIASTOLIC BLOOD PRESSURE: 70 MMHG | HEART RATE: 69 BPM | HEIGHT: 72 IN | OXYGEN SATURATION: 96 % | BODY MASS INDEX: 26.82 KG/M2 | WEIGHT: 198 LBS

## 2024-05-16 DIAGNOSIS — I10 ESSENTIAL (PRIMARY) HYPERTENSION: ICD-10-CM

## 2024-05-16 DIAGNOSIS — E78.5 HYPERLIPIDEMIA, UNSPECIFIED HYPERLIPIDEMIA TYPE: ICD-10-CM

## 2024-05-16 DIAGNOSIS — I47.20 VENTRICULAR TACHYCARDIA (HCC): ICD-10-CM

## 2024-05-16 DIAGNOSIS — I21.3 ST ELEVATION MYOCARDIAL INFARCTION (STEMI), UNSPECIFIED ARTERY (HCC): Primary | ICD-10-CM

## 2024-05-16 PROCEDURE — 93000 ELECTROCARDIOGRAM COMPLETE: CPT | Performed by: NURSE PRACTITIONER

## 2024-05-16 PROCEDURE — 3078F DIAST BP <80 MM HG: CPT | Performed by: NURSE PRACTITIONER

## 2024-05-16 PROCEDURE — 1036F TOBACCO NON-USER: CPT | Performed by: NURSE PRACTITIONER

## 2024-05-16 PROCEDURE — G8427 DOCREV CUR MEDS BY ELIG CLIN: HCPCS | Performed by: NURSE PRACTITIONER

## 2024-05-16 PROCEDURE — 3074F SYST BP LT 130 MM HG: CPT | Performed by: NURSE PRACTITIONER

## 2024-05-16 PROCEDURE — 99215 OFFICE O/P EST HI 40 MIN: CPT | Performed by: NURSE PRACTITIONER

## 2024-05-16 PROCEDURE — G8419 CALC BMI OUT NRM PARAM NOF/U: HCPCS | Performed by: NURSE PRACTITIONER

## 2024-05-16 PROCEDURE — 3017F COLORECTAL CA SCREEN DOC REV: CPT | Performed by: NURSE PRACTITIONER

## 2024-05-16 ASSESSMENT — ENCOUNTER SYMPTOMS
WHEEZING: 0
ABDOMINAL DISTENTION: 0
COUGH: 0
SHORTNESS OF BREATH: 0
CHEST TIGHTNESS: 0
CONSTIPATION: 0
DIARRHEA: 0
BLOOD IN STOOL: 0
NAUSEA: 0
VOMITING: 0

## 2024-05-16 ASSESSMENT — PATIENT HEALTH QUESTIONNAIRE - PHQ9
2. FEELING DOWN, DEPRESSED OR HOPELESS: NOT AT ALL
1. LITTLE INTEREST OR PLEASURE IN DOING THINGS: NOT AT ALL
SUM OF ALL RESPONSES TO PHQ9 QUESTIONS 1 & 2: 0
SUM OF ALL RESPONSES TO PHQ QUESTIONS 1-9: 0

## 2024-05-16 NOTE — PROGRESS NOTES
Rudolph Reed presents today for   Chief Complaint   Patient presents with    Follow-up     4 month       Rudolph Reed preferred language for health care discussion is english/other.    Is someone accompanying this pt? no    Is the patient using any DME equipment during OV? no    Depression Screening:  Depression: Not at risk (5/16/2024)    PHQ-2     PHQ-2 Score: 0        Learning Assessment:  Who is the primary learner? Patient    What is the preferred language for health care of the primary learner? ENGLISH    How does the primary learner prefer to learn new concepts? DEMONSTRATION    Answered By patient    Relationship to Learner SELF           Pt currently taking Anticoagulant therapy? no    Pt currently taking Antiplatelet therapy ? Aspirin  brilinita      Coordination of Care:  1. Have you been to the ER, urgent care clinic since your last visit? Hospitalized since your last visit? no    2. Have you seen or consulted any other health care providers outside of the [facility] since your last visit? Include any pap smears or colon screening. no    
No Known Problems Paternal Uncle     No Known Problems Maternal Grandmother     No Known Problems Maternal Grandfather     No Known Problems Paternal Grandmother     No Known Problems Paternal Grandfather     No Known Problems Other        Social History:  He  reports that he quit smoking about 5 months ago. His smoking use included cigarettes and cigars. He has never used smokeless tobacco.  He  reports current alcohol use.    Review of Systems   Constitutional:  Negative for activity change, appetite change, chills, fatigue, fever and unexpected weight change.   Respiratory:  Negative for cough, chest tightness, shortness of breath and wheezing.    Cardiovascular:  Negative for chest pain, palpitations and leg swelling.   Gastrointestinal:  Negative for abdominal distention, blood in stool, constipation, diarrhea, nausea and vomiting.   Musculoskeletal:  Negative for arthralgias and myalgias.   Skin:  Negative for pallor.   Neurological:  Negative for dizziness, syncope, speech difficulty, weakness, light-headedness and numbness.   Psychiatric/Behavioral:  Negative for confusion.        Physical:  Vitals:  /70 (Site: Right Upper Arm, Position: Sitting, Cuff Size: Large Adult)   Pulse 69   Ht 1.829 m (6')   Wt 89.8 kg (198 lb)   SpO2 96%   BMI 26.85 kg/m²     Exam:  Physical Exam  Constitutional:       Appearance: Normal appearance.   HENT:      Head: Normocephalic and atraumatic.   Neck:      Vascular: No carotid bruit.   Cardiovascular:      Rate and Rhythm: Normal rate and regular rhythm.      Heart sounds: Normal heart sounds. No murmur heard.  Pulmonary:      Effort: Pulmonary effort is normal.      Breath sounds: Normal breath sounds.   Abdominal:      General: Bowel sounds are normal.      Palpations: Abdomen is soft.   Musculoskeletal:      Cervical back: Normal range of motion and neck supple.      Right lower leg: No edema.      Left lower leg: No edema.   Skin:     General: Skin is warm and

## 2025-01-28 ENCOUNTER — OFFICE VISIT (OUTPATIENT)
Age: 65
End: 2025-01-28
Payer: MEDICARE

## 2025-01-28 VITALS — BODY MASS INDEX: 28.44 KG/M2 | WEIGHT: 210 LBS | HEIGHT: 72 IN

## 2025-01-28 DIAGNOSIS — E78.5 HYPERLIPIDEMIA, UNSPECIFIED HYPERLIPIDEMIA TYPE: ICD-10-CM

## 2025-01-28 DIAGNOSIS — I21.3 ST ELEVATION MYOCARDIAL INFARCTION (STEMI), UNSPECIFIED ARTERY (HCC): ICD-10-CM

## 2025-01-28 DIAGNOSIS — I25.10 ATHEROSCLEROSIS OF NATIVE CORONARY ARTERY OF NATIVE HEART WITHOUT ANGINA PECTORIS: Primary | ICD-10-CM

## 2025-01-28 PROCEDURE — 99214 OFFICE O/P EST MOD 30 MIN: CPT | Performed by: INTERNAL MEDICINE

## 2025-01-28 PROCEDURE — 1036F TOBACCO NON-USER: CPT | Performed by: INTERNAL MEDICINE

## 2025-01-28 PROCEDURE — 3017F COLORECTAL CA SCREEN DOC REV: CPT | Performed by: INTERNAL MEDICINE

## 2025-01-28 PROCEDURE — G8427 DOCREV CUR MEDS BY ELIG CLIN: HCPCS | Performed by: INTERNAL MEDICINE

## 2025-01-28 PROCEDURE — G8419 CALC BMI OUT NRM PARAM NOF/U: HCPCS | Performed by: INTERNAL MEDICINE

## 2025-01-28 RX ORDER — ATORVASTATIN CALCIUM 80 MG/1
80 TABLET, FILM COATED ORAL DAILY
Qty: 90 TABLET | Refills: 3 | Status: SHIPPED | OUTPATIENT
Start: 2025-01-28

## 2025-01-28 ASSESSMENT — PATIENT HEALTH QUESTIONNAIRE - PHQ9
2. FEELING DOWN, DEPRESSED OR HOPELESS: NOT AT ALL
SUM OF ALL RESPONSES TO PHQ QUESTIONS 1-9: 0
1. LITTLE INTEREST OR PLEASURE IN DOING THINGS: NOT AT ALL
SUM OF ALL RESPONSES TO PHQ QUESTIONS 1-9: 0
SUM OF ALL RESPONSES TO PHQ9 QUESTIONS 1 & 2: 0
SUM OF ALL RESPONSES TO PHQ QUESTIONS 1-9: 0
SUM OF ALL RESPONSES TO PHQ QUESTIONS 1-9: 0

## 2025-01-28 NOTE — PROGRESS NOTES
Rudolph Reed    Chief Complaint   Patient presents with    Follow-up     Overdue f/u        HPI    Rudolph Reed is a 64 y.o. s/p VT cardiac arrest, anterior stemi with shock, here for overdue but routine follow up.    Prior to episode pt was on no meds and thought no chronic med problems. He denies HTN, no diabetes. He smoke cigars, stays active, and was using viagra prn (but not prior to ACS).    He was out of town at a race with a friend. On the Wed prior- he had a scary episode, felt funny, chest pressure and decided to go lay down. He meant to text his wife to check on him but actually never hit \"send.\" Luckily he says the feeling just went away and he got up and went to eat thinking he had heart burn or something.    That next Friday happened again and he went to lay down in the RV. This time pressure in his chest radiating to b/l shoulders- his friend took one look at him and took him to the ER. He was initially thought to be an NSTEMI (initial trop 165--> 5,868) but then went into VT cardiac arrest, lost his pulse and was brought back/ ROSC. STEMI was called and he was taken to cath lab and had a 90% prox LAD PCI. He required pressors but echo with normal EF and he was eventually discharged in improved state.    He has not had recurrent symptoms/ CP since discharge. Stopped taking all meds except ASA 81 mg.    Past Medical History:   Diagnosis Date    Back pain     Cortisone shots    Hematuria     Prostate cancer screening     STEMI (ST elevation myocardial infarction) (HCC)        Past Surgical History:   Procedure Laterality Date    CARDIAC PROCEDURE N/A 10/27/2023    Left heart cath / coronary angiography performed by Brian Vazquez MD at [facility] CARDIAC CATH LAB    CARDIAC PROCEDURE N/A 10/27/2023    Percutaneous coronary intervention performed by Brian Vazquez MD at [facility] CARDIAC CATH LAB    CARDIAC PROCEDURE N/A 10/27/2023    Insert stent carlos coronary performed by Brian Vazquez MD at [facility] CARDIAC CATH

## 2025-07-25 NOTE — PROGRESS NOTES
Rudolph Reed presents today for a 6 month check-up.  He was seen by Dr. Kelsey in Jan. 28, 2025. He states that he has been doing well since his last visit.  He offers no cardiac complaints.  He admits that he again stopped taking his atorvastatin and was instructed by his PCP to restart the medication when he was last seen.  He states that he joined a gym and is going twice a week.  If he does not go to the gym, he walks across and back over the [place], twice a week.    He is a 64 year old male with history of CAD (s/p anterior wall STEMI and VT cardiac arrest in Oct. 2023).    He was hospitalized from 10/28/23 through 10/31/23 for an acute anterior wall STEMI and VT cardiac arrest. He presented with complaints of \"not feeling well.\"  He was walking along a racetrack when he experienced generalized weakness, abdominal pain and pain in his left arm along with chest tightness and shortness of breath.  He was also diaphoretic.  He was brought to the hospital by a friend.  His initial troponin was elevated and suggestive of a non-STEMI.  He suddenly developed VT on the monitor, an EKG was done, and he was given magnesium sulfate.  He became pulseless and a Code Blue was called.  ROSC was obtained after a few minutes and a STEMI alert was called.     He was taken to the cath lab emergently and it demonstrated:   Left main is a large-caliber vessel and has a no angiographic stenosis.  LAD is a very large caliber type III vessel and right near its origin has a 90% stenosis with thrombus is a culprit lesion.  LAD artery goes all the way to apex and wraps around and also supplies inferior apical segment has no further stenosis.  Circumflex artery is a caliber codominant system and proximal segment AV groove segment and obtuse marginal branches no disease.  RCA is a moderate caliber vessel and proximal mid and distal segment has no angiographic stenosis.    Primary stenting was performed using a 4.5 x 15 mm

## 2025-07-30 ENCOUNTER — OFFICE VISIT (OUTPATIENT)
Age: 65
End: 2025-07-30
Payer: MEDICARE

## 2025-07-30 VITALS
BODY MASS INDEX: 28.99 KG/M2 | DIASTOLIC BLOOD PRESSURE: 80 MMHG | OXYGEN SATURATION: 98 % | HEART RATE: 85 BPM | HEIGHT: 72 IN | SYSTOLIC BLOOD PRESSURE: 130 MMHG | WEIGHT: 214 LBS

## 2025-07-30 DIAGNOSIS — I25.10 ATHEROSCLEROSIS OF NATIVE CORONARY ARTERY OF NATIVE HEART WITHOUT ANGINA PECTORIS: Primary | ICD-10-CM

## 2025-07-30 DIAGNOSIS — I21.02 ST ELEVATION MYOCARDIAL INFARCTION INVOLVING LEFT ANTERIOR DESCENDING (LAD) CORONARY ARTERY (HCC): ICD-10-CM

## 2025-07-30 DIAGNOSIS — E78.5 HYPERLIPIDEMIA, UNSPECIFIED HYPERLIPIDEMIA TYPE: ICD-10-CM

## 2025-07-30 PROCEDURE — G8419 CALC BMI OUT NRM PARAM NOF/U: HCPCS | Performed by: NURSE PRACTITIONER

## 2025-07-30 PROCEDURE — 99215 OFFICE O/P EST HI 40 MIN: CPT | Performed by: NURSE PRACTITIONER

## 2025-07-30 PROCEDURE — G8427 DOCREV CUR MEDS BY ELIG CLIN: HCPCS | Performed by: NURSE PRACTITIONER

## 2025-07-30 PROCEDURE — 3017F COLORECTAL CA SCREEN DOC REV: CPT | Performed by: NURSE PRACTITIONER

## 2025-07-30 PROCEDURE — 1036F TOBACCO NON-USER: CPT | Performed by: NURSE PRACTITIONER

## 2025-07-30 ASSESSMENT — PATIENT HEALTH QUESTIONNAIRE - PHQ9
SUM OF ALL RESPONSES TO PHQ QUESTIONS 1-9: 0
2. FEELING DOWN, DEPRESSED OR HOPELESS: NOT AT ALL
1. LITTLE INTEREST OR PLEASURE IN DOING THINGS: NOT AT ALL

## 2025-07-30 NOTE — PROGRESS NOTES
Rudolph Reed presents today for   Chief Complaint   Patient presents with    Follow-up     6 month       Rudolph Reed preferred language for health care discussion is english/other.    Is someone accompanying this pt? no    Is the patient using any DME equipment during OV? no    Depression Screening:  Depression: Not at risk (7/30/2025)    PHQ-2     PHQ-2 Score: 0        Learning Assessment:  Who is the primary learner? Patient    What is the preferred language for health care of the primary learner? ENGLISH    How does the primary learner prefer to learn new concepts? DEMONSTRATION    Answered By patient    Relationship to Learner SELF           Pt currently taking Anticoagulant therapy? no    Pt currently taking Antiplatelet therapy ? Aspirin 81 mg dialy      Coordination of Care:  1. Have you been to the ER, urgent care clinic since your last visit? Hospitalized since your last visit? no    2. Have you seen or consulted any other health care providers outside of the [facility] since your last visit? Include any pap smears or colon screening. no

## 2025-07-30 NOTE — PATIENT INSTRUCTIONS
Please take your Aspirin 81mg daily without fail  Please take your Atorvastatin (Lipitor) 80mg daily without fail  Heart healthy diet  Continue walking for exercise  Follow-up with Dr. Kelsey as scheduled and as needed

## (undated) DEVICE — TOOL INSRT 0022IN S STL GWIRE

## (undated) DEVICE — GUIDEWIRE VASC L150CM DIA0.035IN FLX END L7CM J 3MM PTFE

## (undated) DEVICE — PINNACLE INTRODUCER SHEATH: Brand: PINNACLE

## (undated) DEVICE — DEVICE INFL 20ML BRL POLYCARB ANALG LUMINESCENT DISPLAY ANG

## (undated) DEVICE — DEVICE TORQ FLRESCNT PNK FOR HEMSTAS VLV

## (undated) DEVICE — BOWL MED M 16OZ PLAS CAP GRAD

## (undated) DEVICE — PRESSURE TUBING: Brand: TRUWAVE

## (undated) DEVICE — ANGIO-SEAL VIP VASCULAR CLOSURE DEVICE: Brand: ANGIO-SEAL

## (undated) DEVICE — GUIDE CATHETER: Brand: MACH1™

## (undated) DEVICE — CATHETER DIAG 6FR L110CM PIGTAILS CRV STYL PIG145 DXTERITY

## (undated) DEVICE — RUNTHROUGH NS EXTRA FLOPPY PTCA GUIDEWIRE: Brand: RUNTHROUGH

## (undated) DEVICE — Z INACTIVE UOM QTY CHANGE USE 2863475 DEVICE INFL 20ML BRL POLYCARB ANALG

## (undated) DEVICE — CATHETER DIAG 6FR L100CM LUMN ID0.056IN JR4 CRV 0 SIDE H

## (undated) DEVICE — SYRINGE MED 10ML RED POLYCARB BRL FIX M LUER CONN FLAT GRP

## (undated) DEVICE — SC 3W MP RA OFF PB - PG: Brand: NAMIC

## (undated) DEVICE — VALVE HEMSTAS 9FR POLYCARB L BOR DBL Y ACCS +

## (undated) DEVICE — SURGICAL PROCEDURE TRAY CRD CATH 3 PRT

## (undated) DEVICE — SYRINGE ANGIO 20ML WHT POLYCARB VAC PRSS CAP PLUNG FIX M